# Patient Record
Sex: FEMALE | Race: BLACK OR AFRICAN AMERICAN | NOT HISPANIC OR LATINO
[De-identification: names, ages, dates, MRNs, and addresses within clinical notes are randomized per-mention and may not be internally consistent; named-entity substitution may affect disease eponyms.]

---

## 2017-04-10 PROBLEM — Z00.00 ENCOUNTER FOR PREVENTIVE HEALTH EXAMINATION: Status: ACTIVE | Noted: 2017-04-10

## 2017-04-21 ENCOUNTER — APPOINTMENT (OUTPATIENT)
Dept: ORTHOPEDIC SURGERY | Facility: CLINIC | Age: 77
End: 2017-04-21

## 2017-04-21 VITALS — HEART RATE: 120 BPM | SYSTOLIC BLOOD PRESSURE: 146 MMHG | DIASTOLIC BLOOD PRESSURE: 86 MMHG

## 2017-04-21 VITALS — BODY MASS INDEX: 26.43 KG/M2 | WEIGHT: 140 LBS | HEIGHT: 61 IN

## 2017-04-21 DIAGNOSIS — Z56.0 UNEMPLOYMENT, UNSPECIFIED: ICD-10-CM

## 2017-04-21 DIAGNOSIS — M25.562 PAIN IN RIGHT KNEE: ICD-10-CM

## 2017-04-21 DIAGNOSIS — Z78.9 OTHER SPECIFIED HEALTH STATUS: ICD-10-CM

## 2017-04-21 DIAGNOSIS — Z60.2 PROBLEMS RELATED TO LIVING ALONE: ICD-10-CM

## 2017-04-21 DIAGNOSIS — Z82.61 FAMILY HISTORY OF ARTHRITIS: ICD-10-CM

## 2017-04-21 DIAGNOSIS — Z86.73 PERSONAL HISTORY OF TRANSIENT ISCHEMIC ATTACK (TIA), AND CEREBRAL INFARCTION W/OUT RESIDUAL DEFICITS: ICD-10-CM

## 2017-04-21 DIAGNOSIS — Z87.39 PERSONAL HISTORY OF OTHER DISEASES OF THE MUSCULOSKELETAL SYSTEM AND CONNECTIVE TISSUE: ICD-10-CM

## 2017-04-21 DIAGNOSIS — O00.90 UNSPECIFIED. ECTOPIC. PREGNANCY WITHOUT INTRAUTERINE PREGNANCY: ICD-10-CM

## 2017-04-21 DIAGNOSIS — Z86.39 PERSONAL HISTORY OF OTHER ENDOCRINE, NUTRITIONAL AND METABOLIC DISEASE: ICD-10-CM

## 2017-04-21 DIAGNOSIS — Z86.79 PERSONAL HISTORY OF OTHER DISEASES OF THE CIRCULATORY SYSTEM: ICD-10-CM

## 2017-04-21 DIAGNOSIS — E78.00 PURE HYPERCHOLESTEROLEMIA, UNSPECIFIED: ICD-10-CM

## 2017-04-21 DIAGNOSIS — G89.29 PAIN IN RIGHT KNEE: ICD-10-CM

## 2017-04-21 DIAGNOSIS — M25.561 PAIN IN RIGHT KNEE: ICD-10-CM

## 2017-04-21 RX ORDER — NORTRIPTYLINE HYDROCHLORIDE 50 MG/1
50 CAPSULE ORAL
Refills: 0 | Status: ACTIVE | COMMUNITY

## 2017-04-21 RX ORDER — ENALAPRIL MALEATE 2.5 MG/1
2.5 TABLET ORAL
Refills: 0 | Status: ACTIVE | COMMUNITY

## 2017-04-21 RX ORDER — OMEPRAZOLE 20 MG/1
20 CAPSULE, DELAYED RELEASE ORAL
Refills: 0 | Status: ACTIVE | COMMUNITY

## 2017-04-21 RX ORDER — METFORMIN HYDROCHLORIDE 850 MG/1
850 TABLET, COATED ORAL
Refills: 0 | Status: ACTIVE | COMMUNITY

## 2017-04-21 RX ORDER — ROSUVASTATIN CALCIUM 10 MG/1
10 TABLET, FILM COATED ORAL
Refills: 0 | Status: ACTIVE | COMMUNITY

## 2017-04-21 RX ORDER — NICOTINE POLACRILEX 2 MG
GUM BUCCAL
Refills: 0 | Status: ACTIVE | COMMUNITY

## 2017-04-21 RX ORDER — ASPIRIN 81 MG
81 TABLET, DELAYED RELEASE (ENTERIC COATED) ORAL
Refills: 0 | Status: ACTIVE | COMMUNITY

## 2017-04-21 RX ORDER — SITAGLIPTIN 25 MG/1
25 TABLET, FILM COATED ORAL
Refills: 0 | Status: ACTIVE | COMMUNITY

## 2017-04-21 RX ORDER — METOPROLOL SUCCINATE 25 MG/1
25 TABLET, EXTENDED RELEASE ORAL
Refills: 0 | Status: ACTIVE | COMMUNITY

## 2017-04-21 RX ORDER — FOLIC ACID 1 MG/1
1 TABLET ORAL
Refills: 0 | Status: ACTIVE | COMMUNITY

## 2017-04-21 SDOH — ECONOMIC STABILITY - INCOME SECURITY: UNEMPLOYMENT, UNSPECIFIED: Z56.0

## 2017-04-21 SDOH — SOCIAL STABILITY - SOCIAL INSECURITY: PROBLEMS RELATED TO LIVING ALONE: Z60.2

## 2018-05-02 ENCOUNTER — OUTPATIENT (OUTPATIENT)
Dept: OUTPATIENT SERVICES | Facility: HOSPITAL | Age: 78
LOS: 1 days | End: 2018-05-02
Payer: MEDICARE

## 2018-05-02 VITALS
HEIGHT: 60 IN | WEIGHT: 138.89 LBS | RESPIRATION RATE: 16 BRPM | TEMPERATURE: 98 F | SYSTOLIC BLOOD PRESSURE: 145 MMHG | DIASTOLIC BLOOD PRESSURE: 89 MMHG | HEART RATE: 88 BPM | OXYGEN SATURATION: 98 %

## 2018-05-02 DIAGNOSIS — Z98.890 OTHER SPECIFIED POSTPROCEDURAL STATES: Chronic | ICD-10-CM

## 2018-05-02 DIAGNOSIS — Z90.79 ACQUIRED ABSENCE OF OTHER GENITAL ORGAN(S): Chronic | ICD-10-CM

## 2018-05-02 DIAGNOSIS — Z98.49 CATARACT EXTRACTION STATUS, UNSPECIFIED EYE: Chronic | ICD-10-CM

## 2018-05-02 DIAGNOSIS — M17.12 UNILATERAL PRIMARY OSTEOARTHRITIS, LEFT KNEE: ICD-10-CM

## 2018-05-02 DIAGNOSIS — Z90.89 ACQUIRED ABSENCE OF OTHER ORGANS: Chronic | ICD-10-CM

## 2018-05-02 DIAGNOSIS — Z01.818 ENCOUNTER FOR OTHER PREPROCEDURAL EXAMINATION: ICD-10-CM

## 2018-05-02 DIAGNOSIS — Z90.49 ACQUIRED ABSENCE OF OTHER SPECIFIED PARTS OF DIGESTIVE TRACT: Chronic | ICD-10-CM

## 2018-05-02 DIAGNOSIS — Z90.710 ACQUIRED ABSENCE OF BOTH CERVIX AND UTERUS: Chronic | ICD-10-CM

## 2018-05-02 LAB
MRSA PCR RESULT.: SIGNIFICANT CHANGE UP
S AUREUS DNA NOSE QL NAA+PROBE: SIGNIFICANT CHANGE UP

## 2018-05-02 PROCEDURE — 86900 BLOOD TYPING SEROLOGIC ABO: CPT

## 2018-05-02 PROCEDURE — 86850 RBC ANTIBODY SCREEN: CPT

## 2018-05-02 PROCEDURE — 87640 STAPH A DNA AMP PROBE: CPT

## 2018-05-02 PROCEDURE — G0463: CPT

## 2018-05-02 PROCEDURE — 86901 BLOOD TYPING SEROLOGIC RH(D): CPT

## 2018-05-02 PROCEDURE — 87641 MR-STAPH DNA AMP PROBE: CPT

## 2018-05-02 RX ORDER — TRAMADOL HYDROCHLORIDE 50 MG/1
50 TABLET ORAL ONCE
Qty: 0 | Refills: 0 | Status: DISCONTINUED | OUTPATIENT
Start: 2018-05-08 | End: 2018-05-08

## 2018-05-02 RX ORDER — SODIUM CHLORIDE 9 MG/ML
3 INJECTION INTRAMUSCULAR; INTRAVENOUS; SUBCUTANEOUS EVERY 8 HOURS
Qty: 0 | Refills: 0 | Status: DISCONTINUED | OUTPATIENT
Start: 2018-05-08 | End: 2018-05-08

## 2018-05-02 NOTE — H&P PST ADULT - PMH
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

## 2018-05-02 NOTE — H&P PST ADULT - NEGATIVE NEUROLOGICAL SYMPTOMS
no loss of consciousness/no hemiparesis/no weakness/no generalized seizures/no syncope/no confusion/no focal seizures/no tremors/no paresthesias/no vertigo/no loss of sensation

## 2018-05-02 NOTE — H&P PST ADULT - NEGATIVE CARDIOVASCULAR SYMPTOMS
no palpitations/no claudication/no paroxysmal nocturnal dyspnea/no chest pain/no dyspnea on exertion/no orthopnea/no peripheral edema

## 2018-05-02 NOTE — H&P PST ADULT - NEGATIVE ENMT SYMPTOMS
no hearing difficulty/no dysphagia/no nasal congestion/no ear pain/no vertigo/no sinus symptoms/no nasal discharge/no tinnitus/no post-nasal discharge/no throat pain/no nasal obstruction

## 2018-05-02 NOTE — H&P PST ADULT - GASTROINTESTINAL DETAILS
soft/no distention/normal/bowel sounds normal/no rebound tenderness/no bruit/no guarding/nontender/no masses palpable

## 2018-05-02 NOTE — H&P PST ADULT - NSANTHOSAYNRD_GEN_A_CORE
No. VIDA screening performed.  STOP BANG Legend: 0-2 = LOW Risk; 3-4 = INTERMEDIATE Risk; 5-8 = HIGH Risk

## 2018-05-02 NOTE — H&P PST ADULT - NEGATIVE GASTROINTESTINAL SYMPTOMS
no change in bowel habits/no flatulence/no nausea/no diarrhea/no constipation/no vomiting/no abdominal pain/no melena

## 2018-05-02 NOTE — H&P PST ADULT - NEGATIVE OPHTHALMOLOGIC SYMPTOMS
reading and distance glasses/no diplopia/no irritation R/no lacrimation L/no lacrimation R/no irritation L/no loss of vision L/no loss of vision R/no photophobia

## 2018-05-02 NOTE — H&P PST ADULT - PROBLEM SELECTOR PLAN 1
Patient is scheduled for left total knee replacement on 5/8/18.Patient is at moderate risk for this surgery.

## 2018-05-02 NOTE — H&P PST ADULT - MUSCULOSKELETAL
details… detailed exam no joint erythema/diminished strength/left and right knee, left knee tenderness to palpation/no joint swelling/no calf tenderness/no joint warmth/decreased ROM due to pain

## 2018-05-02 NOTE — H&P PST ADULT - NEGATIVE ALLERGY TYPES
no outdoor environmental allergies/no reactions to food/no reactions to insect bites/no indoor environmental allergies/no reactions to animals

## 2018-05-02 NOTE — H&P PST ADULT - FAMILY HISTORY
Diabetes mellitus, type 2     Mother  Still living? No  Diabetes mellitus, type 2, Age at diagnosis: Age Unknown  Cerebrovascular accident, Age at diagnosis: Age Unknown  Family history of cardiac disorder, Age at diagnosis: Age Unknown     Sibling  Still living? Yes, Estimated age: Age Unknown  Family history of breast cancer, Age at diagnosis: Age Unknown  Family history of asthma, Age at diagnosis: Age Unknown

## 2018-05-02 NOTE — H&P PST ADULT - RS GEN PE MLT RESP DETAILS PC
clear to auscultation bilaterally/no wheezes/respirations non-labored/breath sounds equal/good air movement/airway patent/no rales/no rhonchi

## 2018-05-02 NOTE — H&P PST ADULT - HISTORY OF PRESENT ILLNESS
78 years old female presented with left knee pain , limited ROM x long time, diagnosed with left knee primary OA and is scheduled for left total knee replacement on 5/8/18.

## 2018-05-02 NOTE — H&P PST ADULT - PSH
History of salpingo-oophorectomy  right - after ectopic pregnancy  S/P appendectomy    S/P arthroscopy of left knee  2005  S/P bunionectomy  right and left  S/P cataract extraction  bilateral eyes with artificial lenses  S/P eye surgery  bilateral  S/P hysterectomy with oophorectomy  1996  S/P tonsillectomy  age 17

## 2018-05-07 ENCOUNTER — TRANSCRIPTION ENCOUNTER (OUTPATIENT)
Age: 78
End: 2018-05-07

## 2018-05-08 ENCOUNTER — RESULT REVIEW (OUTPATIENT)
Age: 78
End: 2018-05-08

## 2018-05-08 ENCOUNTER — INPATIENT (INPATIENT)
Facility: HOSPITAL | Age: 78
LOS: 2 days | Discharge: NURSING, REHAB OR LONG-TERM CARE | DRG: 470 | End: 2018-05-11
Attending: ORTHOPAEDIC SURGERY | Admitting: ORTHOPAEDIC SURGERY
Payer: MEDICARE

## 2018-05-08 VITALS
HEIGHT: 60 IN | SYSTOLIC BLOOD PRESSURE: 130 MMHG | HEART RATE: 75 BPM | OXYGEN SATURATION: 100 % | RESPIRATION RATE: 18 BRPM | WEIGHT: 138.89 LBS | TEMPERATURE: 98 F | DIASTOLIC BLOOD PRESSURE: 65 MMHG

## 2018-05-08 DIAGNOSIS — Z98.49 CATARACT EXTRACTION STATUS, UNSPECIFIED EYE: Chronic | ICD-10-CM

## 2018-05-08 DIAGNOSIS — Z98.890 OTHER SPECIFIED POSTPROCEDURAL STATES: Chronic | ICD-10-CM

## 2018-05-08 DIAGNOSIS — Z90.710 ACQUIRED ABSENCE OF BOTH CERVIX AND UTERUS: Chronic | ICD-10-CM

## 2018-05-08 DIAGNOSIS — Z90.79 ACQUIRED ABSENCE OF OTHER GENITAL ORGAN(S): Chronic | ICD-10-CM

## 2018-05-08 DIAGNOSIS — M17.12 UNILATERAL PRIMARY OSTEOARTHRITIS, LEFT KNEE: ICD-10-CM

## 2018-05-08 DIAGNOSIS — Z90.49 ACQUIRED ABSENCE OF OTHER SPECIFIED PARTS OF DIGESTIVE TRACT: Chronic | ICD-10-CM

## 2018-05-08 DIAGNOSIS — Z90.89 ACQUIRED ABSENCE OF OTHER ORGANS: Chronic | ICD-10-CM

## 2018-05-08 LAB
ANION GAP SERPL CALC-SCNC: 5 MMOL/L — SIGNIFICANT CHANGE UP (ref 5–17)
BUN SERPL-MCNC: 8 MG/DL — SIGNIFICANT CHANGE UP (ref 7–18)
CALCIUM SERPL-MCNC: 8.5 MG/DL — SIGNIFICANT CHANGE UP (ref 8.4–10.5)
CHLORIDE SERPL-SCNC: 109 MMOL/L — HIGH (ref 96–108)
CO2 SERPL-SCNC: 30 MMOL/L — SIGNIFICANT CHANGE UP (ref 22–31)
CREAT SERPL-MCNC: 0.56 MG/DL — SIGNIFICANT CHANGE UP (ref 0.5–1.3)
GLUCOSE BLDC GLUCOMTR-MCNC: 152 MG/DL — HIGH (ref 70–99)
GLUCOSE BLDC GLUCOMTR-MCNC: 76 MG/DL — SIGNIFICANT CHANGE UP (ref 70–99)
GLUCOSE SERPL-MCNC: 96 MG/DL — SIGNIFICANT CHANGE UP (ref 70–99)
HCT VFR BLD CALC: 38.2 % — SIGNIFICANT CHANGE UP (ref 34.5–45)
HGB BLD-MCNC: 11.9 G/DL — SIGNIFICANT CHANGE UP (ref 11.5–15.5)
MCHC RBC-ENTMCNC: 28.3 PG — SIGNIFICANT CHANGE UP (ref 27–34)
MCHC RBC-ENTMCNC: 31.2 GM/DL — LOW (ref 32–36)
MCV RBC AUTO: 90.5 FL — SIGNIFICANT CHANGE UP (ref 80–100)
PLATELET # BLD AUTO: 234 K/UL — SIGNIFICANT CHANGE UP (ref 150–400)
POTASSIUM SERPL-MCNC: 4 MMOL/L — SIGNIFICANT CHANGE UP (ref 3.5–5.3)
POTASSIUM SERPL-SCNC: 4 MMOL/L — SIGNIFICANT CHANGE UP (ref 3.5–5.3)
RBC # BLD: 4.22 M/UL — SIGNIFICANT CHANGE UP (ref 3.8–5.2)
RBC # FLD: 13.7 % — SIGNIFICANT CHANGE UP (ref 10.3–14.5)
SODIUM SERPL-SCNC: 144 MMOL/L — SIGNIFICANT CHANGE UP (ref 135–145)
WBC # BLD: 6.4 K/UL — SIGNIFICANT CHANGE UP (ref 3.8–10.5)
WBC # FLD AUTO: 6.4 K/UL — SIGNIFICANT CHANGE UP (ref 3.8–10.5)

## 2018-05-08 PROCEDURE — 73562 X-RAY EXAM OF KNEE 3: CPT | Mod: 26,LT

## 2018-05-08 PROCEDURE — 88305 TISSUE EXAM BY PATHOLOGIST: CPT | Mod: 26

## 2018-05-08 PROCEDURE — 27447 TOTAL KNEE ARTHROPLASTY: CPT | Mod: AS,LT

## 2018-05-08 PROCEDURE — 73560 X-RAY EXAM OF KNEE 1 OR 2: CPT | Mod: 26

## 2018-05-08 PROCEDURE — 20900 REMOVAL OF BONE FOR GRAFT: CPT | Mod: AS,LT

## 2018-05-08 PROCEDURE — 88311 DECALCIFY TISSUE: CPT | Mod: 26

## 2018-05-08 PROCEDURE — 27339 EXC THIGH/KNEE TUM DEP 5CM/>: CPT | Mod: AS,59,LT

## 2018-05-08 RX ORDER — OXYCODONE HYDROCHLORIDE 5 MG/1
5 TABLET ORAL EVERY 4 HOURS
Qty: 0 | Refills: 0 | Status: DISCONTINUED | OUTPATIENT
Start: 2018-05-08 | End: 2018-05-11

## 2018-05-08 RX ORDER — SODIUM CHLORIDE 9 MG/ML
1000 INJECTION INTRAMUSCULAR; INTRAVENOUS; SUBCUTANEOUS
Qty: 0 | Refills: 0 | Status: DISCONTINUED | OUTPATIENT
Start: 2018-05-08 | End: 2018-05-11

## 2018-05-08 RX ORDER — ASCORBIC ACID 60 MG
500 TABLET,CHEWABLE ORAL
Qty: 0 | Refills: 0 | Status: DISCONTINUED | OUTPATIENT
Start: 2018-05-08 | End: 2018-05-11

## 2018-05-08 RX ORDER — KETOROLAC TROMETHAMINE 30 MG/ML
15 SYRINGE (ML) INJECTION EVERY 6 HOURS
Qty: 0 | Refills: 0 | Status: DISCONTINUED | OUTPATIENT
Start: 2018-05-08 | End: 2018-05-11

## 2018-05-08 RX ORDER — DEXAMETHASONE 0.5 MG/5ML
4 ELIXIR ORAL ONCE
Qty: 0 | Refills: 0 | Status: COMPLETED | OUTPATIENT
Start: 2018-05-08 | End: 2018-05-08

## 2018-05-08 RX ORDER — TRAMADOL HYDROCHLORIDE 50 MG/1
50 TABLET ORAL EVERY 8 HOURS
Qty: 0 | Refills: 0 | Status: DISCONTINUED | OUTPATIENT
Start: 2018-05-08 | End: 2018-05-11

## 2018-05-08 RX ORDER — ATORVASTATIN CALCIUM 80 MG/1
40 TABLET, FILM COATED ORAL AT BEDTIME
Qty: 0 | Refills: 0 | Status: DISCONTINUED | OUTPATIENT
Start: 2018-05-08 | End: 2018-05-11

## 2018-05-08 RX ORDER — NORTRIPTYLINE HYDROCHLORIDE 10 MG/1
50 CAPSULE ORAL DAILY
Qty: 0 | Refills: 0 | Status: DISCONTINUED | OUTPATIENT
Start: 2018-05-08 | End: 2018-05-11

## 2018-05-08 RX ORDER — CELECOXIB 200 MG/1
200 CAPSULE ORAL ONCE
Qty: 0 | Refills: 0 | Status: COMPLETED | OUTPATIENT
Start: 2018-05-08 | End: 2018-05-08

## 2018-05-08 RX ORDER — FERROUS SULFATE 325(65) MG
325 TABLET ORAL
Qty: 0 | Refills: 0 | Status: DISCONTINUED | OUTPATIENT
Start: 2018-05-08 | End: 2018-05-11

## 2018-05-08 RX ORDER — ASPIRIN/CALCIUM CARB/MAGNESIUM 324 MG
81 TABLET ORAL DAILY
Qty: 0 | Refills: 0 | Status: DISCONTINUED | OUTPATIENT
Start: 2018-05-09 | End: 2018-05-11

## 2018-05-08 RX ORDER — HYDROMORPHONE HYDROCHLORIDE 2 MG/ML
0.5 INJECTION INTRAMUSCULAR; INTRAVENOUS; SUBCUTANEOUS
Qty: 0 | Refills: 0 | Status: DISCONTINUED | OUTPATIENT
Start: 2018-05-08 | End: 2018-05-08

## 2018-05-08 RX ORDER — OMEPRAZOLE 10 MG/1
1 CAPSULE, DELAYED RELEASE ORAL
Qty: 0 | Refills: 0 | COMMUNITY

## 2018-05-08 RX ORDER — ACETAMINOPHEN 500 MG
1000 TABLET ORAL ONCE
Qty: 0 | Refills: 0 | Status: COMPLETED | OUTPATIENT
Start: 2018-05-08 | End: 2018-05-08

## 2018-05-08 RX ORDER — DOCUSATE SODIUM 100 MG
100 CAPSULE ORAL THREE TIMES A DAY
Qty: 0 | Refills: 0 | Status: DISCONTINUED | OUTPATIENT
Start: 2018-05-08 | End: 2018-05-11

## 2018-05-08 RX ORDER — ACETAMINOPHEN 500 MG
650 TABLET ORAL EVERY 6 HOURS
Qty: 0 | Refills: 0 | Status: DISCONTINUED | OUTPATIENT
Start: 2018-05-08 | End: 2018-05-11

## 2018-05-08 RX ORDER — ENOXAPARIN SODIUM 100 MG/ML
30 INJECTION SUBCUTANEOUS EVERY 12 HOURS
Qty: 0 | Refills: 0 | Status: DISCONTINUED | OUTPATIENT
Start: 2018-05-09 | End: 2018-05-11

## 2018-05-08 RX ORDER — METOPROLOL TARTRATE 50 MG
25 TABLET ORAL DAILY
Qty: 0 | Refills: 0 | Status: DISCONTINUED | OUTPATIENT
Start: 2018-05-08 | End: 2018-05-11

## 2018-05-08 RX ORDER — METFORMIN HYDROCHLORIDE 850 MG/1
1 TABLET ORAL
Qty: 0 | Refills: 0 | COMMUNITY

## 2018-05-08 RX ORDER — TRANEXAMIC ACID 100 MG/ML
1000 INJECTION, SOLUTION INTRAVENOUS ONCE
Qty: 0 | Refills: 0 | Status: DISCONTINUED | OUTPATIENT
Start: 2018-05-08 | End: 2018-05-08

## 2018-05-08 RX ORDER — SITAGLIPTIN 50 MG/1
1 TABLET, FILM COATED ORAL
Qty: 0 | Refills: 0 | COMMUNITY

## 2018-05-08 RX ORDER — ASPIRIN/CALCIUM CARB/MAGNESIUM 324 MG
1 TABLET ORAL
Qty: 0 | Refills: 0 | COMMUNITY

## 2018-05-08 RX ORDER — GABAPENTIN 400 MG/1
100 CAPSULE ORAL DAILY
Qty: 0 | Refills: 0 | Status: DISCONTINUED | OUTPATIENT
Start: 2018-05-08 | End: 2018-05-11

## 2018-05-08 RX ORDER — CHOLECALCIFEROL (VITAMIN D3) 125 MCG
1 CAPSULE ORAL
Qty: 0 | Refills: 0 | COMMUNITY

## 2018-05-08 RX ORDER — FOLIC ACID 0.8 MG
1 TABLET ORAL
Qty: 0 | Refills: 0 | COMMUNITY

## 2018-05-08 RX ORDER — ACETAMINOPHEN 500 MG
1000 TABLET ORAL ONCE
Qty: 0 | Refills: 0 | Status: DISCONTINUED | OUTPATIENT
Start: 2018-05-08 | End: 2018-05-11

## 2018-05-08 RX ORDER — CEFAZOLIN SODIUM 1 G
1000 VIAL (EA) INJECTION EVERY 8 HOURS
Qty: 0 | Refills: 0 | Status: COMPLETED | OUTPATIENT
Start: 2018-05-08 | End: 2018-05-09

## 2018-05-08 RX ORDER — HYDROMORPHONE HYDROCHLORIDE 2 MG/ML
0.5 INJECTION INTRAMUSCULAR; INTRAVENOUS; SUBCUTANEOUS EVERY 4 HOURS
Qty: 0 | Refills: 0 | Status: DISCONTINUED | OUTPATIENT
Start: 2018-05-08 | End: 2018-05-11

## 2018-05-08 RX ORDER — METOPROLOL TARTRATE 50 MG
1 TABLET ORAL
Qty: 0 | Refills: 0 | COMMUNITY

## 2018-05-08 RX ORDER — PANTOPRAZOLE SODIUM 20 MG/1
40 TABLET, DELAYED RELEASE ORAL
Qty: 0 | Refills: 0 | Status: DISCONTINUED | OUTPATIENT
Start: 2018-05-08 | End: 2018-05-11

## 2018-05-08 RX ORDER — GABAPENTIN 400 MG/1
100 CAPSULE ORAL ONCE
Qty: 0 | Refills: 0 | Status: COMPLETED | OUTPATIENT
Start: 2018-05-08 | End: 2018-05-08

## 2018-05-08 RX ORDER — BUPIVACAINE 13.3 MG/ML
20 INJECTION, SUSPENSION, LIPOSOMAL INFILTRATION ONCE
Qty: 0 | Refills: 0 | Status: DISCONTINUED | OUTPATIENT
Start: 2018-05-08 | End: 2018-05-08

## 2018-05-08 RX ORDER — ONDANSETRON 8 MG/1
4 TABLET, FILM COATED ORAL EVERY 6 HOURS
Qty: 0 | Refills: 0 | Status: DISCONTINUED | OUTPATIENT
Start: 2018-05-08 | End: 2018-05-11

## 2018-05-08 RX ORDER — FOLIC ACID 0.8 MG
1 TABLET ORAL DAILY
Qty: 0 | Refills: 0 | Status: DISCONTINUED | OUTPATIENT
Start: 2018-05-08 | End: 2018-05-11

## 2018-05-08 RX ADMIN — TRAMADOL HYDROCHLORIDE 50 MILLIGRAM(S): 50 TABLET ORAL at 10:59

## 2018-05-08 RX ADMIN — TRAMADOL HYDROCHLORIDE 50 MILLIGRAM(S): 50 TABLET ORAL at 00:15

## 2018-05-08 RX ADMIN — TRAMADOL HYDROCHLORIDE 50 MILLIGRAM(S): 50 TABLET ORAL at 22:34

## 2018-05-08 RX ADMIN — SODIUM CHLORIDE 103 MILLILITER(S): 9 INJECTION INTRAMUSCULAR; INTRAVENOUS; SUBCUTANEOUS at 22:34

## 2018-05-08 RX ADMIN — CELECOXIB 200 MILLIGRAM(S): 200 CAPSULE ORAL at 10:58

## 2018-05-08 RX ADMIN — GABAPENTIN 100 MILLIGRAM(S): 400 CAPSULE ORAL at 11:00

## 2018-05-08 RX ADMIN — Medication 4 MILLIGRAM(S): at 19:09

## 2018-05-08 RX ADMIN — ATORVASTATIN CALCIUM 40 MILLIGRAM(S): 80 TABLET, FILM COATED ORAL at 22:33

## 2018-05-08 RX ADMIN — Medication 100 MILLIGRAM(S): at 22:33

## 2018-05-08 NOTE — PHYSICAL THERAPY INITIAL EVALUATION ADULT - LIVES WITH, PROFILE
alone/Patient stated that she lives alone in a private house;has stairs to enter the house and inside the house

## 2018-05-08 NOTE — PHYSICAL THERAPY INITIAL EVALUATION ADULT - DIAGNOSIS, PT EVAL
Patient presented with pain, impairments in ROM/muscle strength, impairments in balance during ambulation

## 2018-05-08 NOTE — BRIEF OPERATIVE NOTE - PROCEDURE
<<-----Click on this checkbox to enter Procedure Total knee arthroplasty  05/08/2018    Active  KILEY

## 2018-05-09 DIAGNOSIS — E78.5 HYPERLIPIDEMIA, UNSPECIFIED: ICD-10-CM

## 2018-05-09 DIAGNOSIS — E11.9 TYPE 2 DIABETES MELLITUS WITHOUT COMPLICATIONS: ICD-10-CM

## 2018-05-09 DIAGNOSIS — I10 ESSENTIAL (PRIMARY) HYPERTENSION: ICD-10-CM

## 2018-05-09 LAB
ANION GAP SERPL CALC-SCNC: 5 MMOL/L — SIGNIFICANT CHANGE UP (ref 5–17)
BUN SERPL-MCNC: 10 MG/DL — SIGNIFICANT CHANGE UP (ref 7–18)
CALCIUM SERPL-MCNC: 8.9 MG/DL — SIGNIFICANT CHANGE UP (ref 8.4–10.5)
CHLORIDE SERPL-SCNC: 107 MMOL/L — SIGNIFICANT CHANGE UP (ref 96–108)
CO2 SERPL-SCNC: 27 MMOL/L — SIGNIFICANT CHANGE UP (ref 22–31)
CREAT SERPL-MCNC: 0.8 MG/DL — SIGNIFICANT CHANGE UP (ref 0.5–1.3)
GLUCOSE BLDC GLUCOMTR-MCNC: 106 MG/DL — HIGH (ref 70–99)
GLUCOSE BLDC GLUCOMTR-MCNC: 270 MG/DL — HIGH (ref 70–99)
GLUCOSE BLDC GLUCOMTR-MCNC: 324 MG/DL — HIGH (ref 70–99)
GLUCOSE SERPL-MCNC: 246 MG/DL — HIGH (ref 70–99)
HCT VFR BLD CALC: 29.4 % — LOW (ref 34.5–45)
HGB BLD-MCNC: 9.1 G/DL — LOW (ref 11.5–15.5)
MCHC RBC-ENTMCNC: 27.8 PG — SIGNIFICANT CHANGE UP (ref 27–34)
MCHC RBC-ENTMCNC: 30.9 GM/DL — LOW (ref 32–36)
MCV RBC AUTO: 90 FL — SIGNIFICANT CHANGE UP (ref 80–100)
PLATELET # BLD AUTO: 198 K/UL — SIGNIFICANT CHANGE UP (ref 150–400)
POTASSIUM SERPL-MCNC: 4.6 MMOL/L — SIGNIFICANT CHANGE UP (ref 3.5–5.3)
POTASSIUM SERPL-SCNC: 4.6 MMOL/L — SIGNIFICANT CHANGE UP (ref 3.5–5.3)
RBC # BLD: 3.26 M/UL — LOW (ref 3.8–5.2)
RBC # FLD: 13.8 % — SIGNIFICANT CHANGE UP (ref 10.3–14.5)
SODIUM SERPL-SCNC: 139 MMOL/L — SIGNIFICANT CHANGE UP (ref 135–145)
WBC # BLD: 12 K/UL — HIGH (ref 3.8–10.5)
WBC # FLD AUTO: 12 K/UL — HIGH (ref 3.8–10.5)

## 2018-05-09 PROCEDURE — 99223 1ST HOSP IP/OBS HIGH 75: CPT

## 2018-05-09 RX ORDER — INSULIN LISPRO 100/ML
VIAL (ML) SUBCUTANEOUS
Qty: 0 | Refills: 0 | Status: DISCONTINUED | OUTPATIENT
Start: 2018-05-09 | End: 2018-05-11

## 2018-05-09 RX ORDER — GLUCAGON INJECTION, SOLUTION 0.5 MG/.1ML
1 INJECTION, SOLUTION SUBCUTANEOUS ONCE
Qty: 0 | Refills: 0 | Status: DISCONTINUED | OUTPATIENT
Start: 2018-05-09 | End: 2018-05-11

## 2018-05-09 RX ORDER — INSULIN LISPRO 100/ML
6 VIAL (ML) SUBCUTANEOUS ONCE
Qty: 0 | Refills: 0 | Status: COMPLETED | OUTPATIENT
Start: 2018-05-09 | End: 2018-05-09

## 2018-05-09 RX ORDER — DEXTROSE 50 % IN WATER 50 %
25 SYRINGE (ML) INTRAVENOUS ONCE
Qty: 0 | Refills: 0 | Status: DISCONTINUED | OUTPATIENT
Start: 2018-05-09 | End: 2018-05-11

## 2018-05-09 RX ORDER — DEXTROSE 50 % IN WATER 50 %
12.5 SYRINGE (ML) INTRAVENOUS ONCE
Qty: 0 | Refills: 0 | Status: DISCONTINUED | OUTPATIENT
Start: 2018-05-09 | End: 2018-05-11

## 2018-05-09 RX ORDER — SODIUM CHLORIDE 9 MG/ML
1000 INJECTION, SOLUTION INTRAVENOUS
Qty: 0 | Refills: 0 | Status: DISCONTINUED | OUTPATIENT
Start: 2018-05-09 | End: 2018-05-11

## 2018-05-09 RX ORDER — DEXTROSE 50 % IN WATER 50 %
15 SYRINGE (ML) INTRAVENOUS ONCE
Qty: 0 | Refills: 0 | Status: DISCONTINUED | OUTPATIENT
Start: 2018-05-09 | End: 2018-05-11

## 2018-05-09 RX ADMIN — ENOXAPARIN SODIUM 30 MILLIGRAM(S): 100 INJECTION SUBCUTANEOUS at 08:13

## 2018-05-09 RX ADMIN — Medication 1 TABLET(S): at 12:02

## 2018-05-09 RX ADMIN — ENOXAPARIN SODIUM 30 MILLIGRAM(S): 100 INJECTION SUBCUTANEOUS at 14:20

## 2018-05-09 RX ADMIN — Medication 325 MILLIGRAM(S): at 17:31

## 2018-05-09 RX ADMIN — Medication 1 MILLIGRAM(S): at 12:06

## 2018-05-09 RX ADMIN — Medication 325 MILLIGRAM(S): at 08:31

## 2018-05-09 RX ADMIN — TRAMADOL HYDROCHLORIDE 50 MILLIGRAM(S): 50 TABLET ORAL at 23:06

## 2018-05-09 RX ADMIN — Medication 81 MILLIGRAM(S): at 12:01

## 2018-05-09 RX ADMIN — Medication 2.5 MILLIGRAM(S): at 07:08

## 2018-05-09 RX ADMIN — ATORVASTATIN CALCIUM 40 MILLIGRAM(S): 80 TABLET, FILM COATED ORAL at 22:05

## 2018-05-09 RX ADMIN — Medication 100 MILLIGRAM(S): at 07:13

## 2018-05-09 RX ADMIN — Medication 6: at 12:01

## 2018-05-09 RX ADMIN — Medication 100 MILLIGRAM(S): at 14:20

## 2018-05-09 RX ADMIN — Medication 500 MILLIGRAM(S): at 17:31

## 2018-05-09 RX ADMIN — Medication 500 MILLIGRAM(S): at 07:07

## 2018-05-09 RX ADMIN — Medication 100 MILLIGRAM(S): at 22:05

## 2018-05-09 RX ADMIN — Medication 100 MILLIGRAM(S): at 07:08

## 2018-05-09 RX ADMIN — Medication 325 MILLIGRAM(S): at 12:02

## 2018-05-09 RX ADMIN — PANTOPRAZOLE SODIUM 40 MILLIGRAM(S): 20 TABLET, DELAYED RELEASE ORAL at 07:07

## 2018-05-09 RX ADMIN — TRAMADOL HYDROCHLORIDE 50 MILLIGRAM(S): 50 TABLET ORAL at 22:06

## 2018-05-09 RX ADMIN — NORTRIPTYLINE HYDROCHLORIDE 50 MILLIGRAM(S): 10 CAPSULE ORAL at 12:03

## 2018-05-09 RX ADMIN — Medication 6 UNIT(S): at 23:12

## 2018-05-09 RX ADMIN — GABAPENTIN 100 MILLIGRAM(S): 400 CAPSULE ORAL at 12:03

## 2018-05-09 NOTE — PROGRESS NOTE ADULT - PROBLEM SELECTOR PLAN 1
78yFemale S/p L Total Knee Replacement POD# 1  - Pain control  - Daily PT-WBAT to LLE  - DVT ppx  - 78yFemale S/p L Total Knee Replacement POD# 1  - Pain control  - Daily PT-WBAT to LLE  - DVT ppx  - C/w 24 hr IV antibx postop

## 2018-05-09 NOTE — PROGRESS NOTE ADULT - SUBJECTIVE AND OBJECTIVE BOX
78yFemale    Diagnosis:  S/p L Total Knee Replacement POD# 1    Patient was seen and evaluated at bedside. Patient with no acute complaints.   Pain is well controlled.   Denies CP/SOB, dyspnea, paresthesias, N/V/D, palpitations.     Vital Signs Last 24 Hrs  T(C): 36.9 (09 May 2018 06:00), Max: 37 (08 May 2018 16:50)  T(F): 98.4 (09 May 2018 06:00), Max: 98.6 (08 May 2018 16:50)  HR: 83 (09 May 2018 06:00) (55 - 86)  BP: 117/57 (09 May 2018 06:00) (112/58 - 152/60)  BP(mean): 80 (08 May 2018 15:30) (78 - 98)  RR: 16 (09 May 2018 06:00) (12 - 20)  SpO2: 99% (09 May 2018 06:00) (98% - 100%)  I&O's Detail    08 May 2018 07:01  -  09 May 2018 07:00  --------------------------------------------------------  IN:    Lactated Ringers IV Bolus: 1800 mL    sodium chloride 0.9%.: 103 mL  Total IN: 1903 mL    OUT:    Estimated Blood Loss: 150 mL    Voided: 1200 mL  Total OUT: 1350 mL    Total NET: 553 mL    Physical Exam:    General: AAOx3, NAD, resting comfortably in bed.    L KNEE:  Dressing is C/D/I. Skin is pink and warm. Staples intact. No erythema. SILT.  Wound with no drainage, healing well.   Lower extremity:  No calf tenderness, calves are soft. 2+pulses. NVI. 5/5 Strength of EHL/TA/gastrocnemius B/L.  Good capillary refill.                           9.1    12.0  )-----------( 198      ( 09 May 2018 07:53 )             29.4     05-09    139  |  107  |  10  ----------------------------<  246<H>  4.6   |  27  |  0.80    Ca    8.9      09 May 2018 07:53        Impression:  78yFemale S/p ___ Total Knee Replacement POD#__  Plan:  -  Pain management  -  Dvt prophylaxis  -  Daily Physical Theapy:  WBAT  PWB   TTWB    NWB  of the ____lower extremity  -  Discharge planning: Home Vs. Rehab pending Physical therapy eval.  -  Heel bump to   -  Incentive Spirometer  -  Continue with Post-op Antibiotics x 24hrs  -  Discontinue Viramontes catheter today  -  Case d/w ___ 78yFemale    Diagnosis:  S/p L Total Knee Replacement POD# 1    Patient was seen and evaluated at bedside. Patient with no acute complaints.   Pain is well controlled. Pt tolerated PT well yesterday.   Denies CP/SOB, dyspnea, paresthesias, N/V/D, palpitations.     Vital Signs Last 24 Hrs  T(C): 36.9 (09 May 2018 06:00), Max: 37 (08 May 2018 16:50)  T(F): 98.4 (09 May 2018 06:00), Max: 98.6 (08 May 2018 16:50)  HR: 83 (09 May 2018 06:00) (55 - 86)  BP: 117/57 (09 May 2018 06:00) (112/58 - 152/60)  BP(mean): 80 (08 May 2018 15:30) (78 - 98)  RR: 16 (09 May 2018 06:00) (12 - 20)  SpO2: 99% (09 May 2018 06:00) (98% - 100%)  I&O's Detail    08 May 2018 07:01  -  09 May 2018 07:00  --------------------------------------------------------  IN:    Lactated Ringers IV Bolus: 1800 mL    sodium chloride 0.9%.: 103 mL  Total IN: 1903 mL    OUT:    Estimated Blood Loss: 150 mL    Voided: 1200 mL  Total OUT: 1350 mL    Total NET: 553 mL    Physical Exam:    General: AAOx3, NAD, resting comfortably in bed.    L KNEE:  Dressing is C/D/I. No swelling. No erythema. SILT.  Lower extremity:  No calf tenderness, calves are soft. 2+pulses. NVI. (+) EHL/FHL/ADF/APF.  Good capillary refill.                           9.1    12.0  )-----------( 198      ( 09 May 2018 07:53 )             29.4     05-09    139  |  107  |  10  ----------------------------<  246<H>  4.6   |  27  |  0.80    Ca    8.9      09 May 2018 07:53      Impression:  78yFemale S/p L Total Knee Replacement POD# 1  Plan:  -  Pain management  -  DVT prophylaxis  -  Daily Physical Therapy:  WBAT  to the LLE  -  Discharge planning: Home Vs. Rehab pending Physical therapy eval.  -  Heel bump to LLE  -  Incentive Spirometer  -  Continue with Post-op Antibiotics x 24hrs  -  Case d/w DR. Conteh

## 2018-05-10 ENCOUNTER — TRANSCRIPTION ENCOUNTER (OUTPATIENT)
Age: 78
End: 2018-05-10

## 2018-05-10 LAB
ANION GAP SERPL CALC-SCNC: 5 MMOL/L — SIGNIFICANT CHANGE UP (ref 5–17)
BUN SERPL-MCNC: 10 MG/DL — SIGNIFICANT CHANGE UP (ref 7–18)
CALCIUM SERPL-MCNC: 7.9 MG/DL — LOW (ref 8.4–10.5)
CHLORIDE SERPL-SCNC: 105 MMOL/L — SIGNIFICANT CHANGE UP (ref 96–108)
CO2 SERPL-SCNC: 29 MMOL/L — SIGNIFICANT CHANGE UP (ref 22–31)
CREAT SERPL-MCNC: 0.66 MG/DL — SIGNIFICANT CHANGE UP (ref 0.5–1.3)
GLUCOSE BLDC GLUCOMTR-MCNC: 167 MG/DL — HIGH (ref 70–99)
GLUCOSE BLDC GLUCOMTR-MCNC: 179 MG/DL — HIGH (ref 70–99)
GLUCOSE BLDC GLUCOMTR-MCNC: 190 MG/DL — HIGH (ref 70–99)
GLUCOSE BLDC GLUCOMTR-MCNC: 211 MG/DL — HIGH (ref 70–99)
GLUCOSE SERPL-MCNC: 185 MG/DL — HIGH (ref 70–99)
HCT VFR BLD CALC: 24.6 % — LOW (ref 34.5–45)
HGB BLD-MCNC: 7.7 G/DL — LOW (ref 11.5–15.5)
MCHC RBC-ENTMCNC: 28.2 PG — SIGNIFICANT CHANGE UP (ref 27–34)
MCHC RBC-ENTMCNC: 31.4 GM/DL — LOW (ref 32–36)
MCV RBC AUTO: 89.7 FL — SIGNIFICANT CHANGE UP (ref 80–100)
PLATELET # BLD AUTO: 158 K/UL — SIGNIFICANT CHANGE UP (ref 150–400)
POTASSIUM SERPL-MCNC: 3.3 MMOL/L — LOW (ref 3.5–5.3)
POTASSIUM SERPL-SCNC: 3.3 MMOL/L — LOW (ref 3.5–5.3)
RBC # BLD: 2.74 M/UL — LOW (ref 3.8–5.2)
RBC # FLD: 14.1 % — SIGNIFICANT CHANGE UP (ref 10.3–14.5)
SODIUM SERPL-SCNC: 139 MMOL/L — SIGNIFICANT CHANGE UP (ref 135–145)
SURGICAL PATHOLOGY FINAL REPORT - CH: SIGNIFICANT CHANGE UP
WBC # BLD: 10.8 K/UL — HIGH (ref 3.8–10.5)
WBC # FLD AUTO: 10.8 K/UL — HIGH (ref 3.8–10.5)

## 2018-05-10 RX ORDER — DOCUSATE SODIUM 100 MG
100 CAPSULE ORAL
Qty: 0 | Refills: 0 | COMMUNITY

## 2018-05-10 RX ORDER — FUROSEMIDE 40 MG
20 TABLET ORAL ONCE
Qty: 0 | Refills: 0 | Status: COMPLETED | OUTPATIENT
Start: 2018-05-10 | End: 2018-05-10

## 2018-05-10 RX ORDER — MELOXICAM 15 MG/1
1 TABLET ORAL
Qty: 0 | Refills: 0 | COMMUNITY

## 2018-05-10 RX ORDER — ACETAMINOPHEN 500 MG
1000 TABLET ORAL ONCE
Qty: 0 | Refills: 0 | Status: COMPLETED | OUTPATIENT
Start: 2018-05-10 | End: 2018-05-10

## 2018-05-10 RX ORDER — HYDROCORTISONE 1 %
1 OINTMENT (GRAM) TOPICAL
Qty: 0 | Refills: 0 | COMMUNITY

## 2018-05-10 RX ORDER — GABAPENTIN 400 MG/1
1 CAPSULE ORAL
Qty: 0 | Refills: 0 | DISCHARGE
Start: 2018-05-10

## 2018-05-10 RX ORDER — DIPHENHYDRAMINE HCL 50 MG
25 CAPSULE ORAL ONCE
Qty: 0 | Refills: 0 | Status: COMPLETED | OUTPATIENT
Start: 2018-05-10 | End: 2018-05-10

## 2018-05-10 RX ORDER — POTASSIUM CHLORIDE 20 MEQ
40 PACKET (EA) ORAL EVERY 4 HOURS
Qty: 0 | Refills: 0 | Status: COMPLETED | OUTPATIENT
Start: 2018-05-10 | End: 2018-05-10

## 2018-05-10 RX ADMIN — Medication 325 MILLIGRAM(S): at 12:03

## 2018-05-10 RX ADMIN — Medication 1 TABLET(S): at 12:03

## 2018-05-10 RX ADMIN — ENOXAPARIN SODIUM 30 MILLIGRAM(S): 100 INJECTION SUBCUTANEOUS at 14:16

## 2018-05-10 RX ADMIN — PANTOPRAZOLE SODIUM 40 MILLIGRAM(S): 20 TABLET, DELAYED RELEASE ORAL at 06:11

## 2018-05-10 RX ADMIN — GABAPENTIN 100 MILLIGRAM(S): 400 CAPSULE ORAL at 12:03

## 2018-05-10 RX ADMIN — TRAMADOL HYDROCHLORIDE 50 MILLIGRAM(S): 50 TABLET ORAL at 16:45

## 2018-05-10 RX ADMIN — ATORVASTATIN CALCIUM 40 MILLIGRAM(S): 80 TABLET, FILM COATED ORAL at 22:47

## 2018-05-10 RX ADMIN — Medication 40 MILLIEQUIVALENT(S): at 11:39

## 2018-05-10 RX ADMIN — Medication 81 MILLIGRAM(S): at 12:05

## 2018-05-10 RX ADMIN — Medication 2.5 MILLIGRAM(S): at 05:34

## 2018-05-10 RX ADMIN — Medication 25 MILLIGRAM(S): at 18:30

## 2018-05-10 RX ADMIN — Medication 1000 MILLIGRAM(S): at 18:30

## 2018-05-10 RX ADMIN — Medication 40 MILLIEQUIVALENT(S): at 14:16

## 2018-05-10 RX ADMIN — Medication 4: at 08:26

## 2018-05-10 RX ADMIN — Medication 20 MILLIGRAM(S): at 14:58

## 2018-05-10 RX ADMIN — Medication 15 MILLIGRAM(S): at 11:05

## 2018-05-10 RX ADMIN — Medication 100 MILLIGRAM(S): at 22:47

## 2018-05-10 RX ADMIN — ENOXAPARIN SODIUM 30 MILLIGRAM(S): 100 INJECTION SUBCUTANEOUS at 01:06

## 2018-05-10 RX ADMIN — Medication 15 MILLIGRAM(S): at 10:39

## 2018-05-10 RX ADMIN — TRAMADOL HYDROCHLORIDE 50 MILLIGRAM(S): 50 TABLET ORAL at 14:16

## 2018-05-10 RX ADMIN — Medication 325 MILLIGRAM(S): at 08:27

## 2018-05-10 RX ADMIN — TRAMADOL HYDROCHLORIDE 50 MILLIGRAM(S): 50 TABLET ORAL at 05:34

## 2018-05-10 RX ADMIN — Medication 25 MILLIGRAM(S): at 05:34

## 2018-05-10 RX ADMIN — TRAMADOL HYDROCHLORIDE 50 MILLIGRAM(S): 50 TABLET ORAL at 06:30

## 2018-05-10 RX ADMIN — Medication 500 MILLIGRAM(S): at 05:34

## 2018-05-10 RX ADMIN — Medication 2: at 11:37

## 2018-05-10 RX ADMIN — NORTRIPTYLINE HYDROCHLORIDE 50 MILLIGRAM(S): 10 CAPSULE ORAL at 12:03

## 2018-05-10 RX ADMIN — Medication 2: at 17:00

## 2018-05-10 RX ADMIN — Medication 325 MILLIGRAM(S): at 18:30

## 2018-05-10 RX ADMIN — Medication 400 MILLIGRAM(S): at 18:30

## 2018-05-10 RX ADMIN — Medication 500 MILLIGRAM(S): at 18:30

## 2018-05-10 RX ADMIN — Medication 100 MILLIGRAM(S): at 14:16

## 2018-05-10 RX ADMIN — Medication 1 MILLIGRAM(S): at 12:07

## 2018-05-10 NOTE — DISCHARGE NOTE ADULT - CARE PROVIDER_API CALL
Angelo Conteh (MD), Orthopaedic Surgery; Sports Medicine  [address]  [place] [address]  Phone: [phone]  Fax: [phone]

## 2018-05-10 NOTE — DISCHARGE NOTE ADULT - PATIENT PORTAL LINK FT
You can access the [facility] Patient Portal, offered by [facility], by registering with the following website: [email or web address]

## 2018-05-10 NOTE — DISCHARGE NOTE ADULT - CARE PLAN
Principal Discharge DX:	Primary osteoarthritis of left knee  Goal:	IMPROVE ROM  Assessment and plan of treatment:	IMPROVE PAIN  Secondary Diagnosis:	HLD (hyperlipidemia)  Secondary Diagnosis:	Hypertension  Secondary Diagnosis:	Lower back pain

## 2018-05-10 NOTE — PROGRESS NOTE ADULT - SUBJECTIVE AND OBJECTIVE BOX
78yFemale    Diagnosis:  S/p Left Total Knee Replacement POD#2    Patient was seen and evaluated at bedside. Patient with no acute complaints.   Pain is  well controlled.  seen by PT, ambulating well.    Denies dizziness, CP/SOB, dyspnea, paresthesias, N/V/D, palpitations.     Vital Signs Last 24 Hrs  T(C): 37.3 (10 May 2018 06:39), Max: 37.5 (09 May 2018 22:05)  T(F): 99.1 (10 May 2018 06:39), Max: 99.5 (09 May 2018 22:05)  HR: 105 (10 May 2018 06:39) (87 - 105)  BP: 128/49 (10 May 2018 06:39) (109/45 - 128/49)  BP(mean): --  RR: 16 (10 May 2018 06:39) (16 - 18)  SpO2: 99% (10 May 2018 06:39) (97% - 99%)  I&O's Detail      Physical Exam:    General: AAOx3, NAD, resting comfortably in bed.    Left knee:  Dressing removed-> Wound is clean, dry, with staples intact. No erythema. Skin is pink and warm. SILT.  No drainage.   Lower extremities:  No calf tenderness, calves are soft. 2+pulses. NVI. 5/5 Strength of EHL/TA/gastrocnemius B/L.  Good capillary refill. SILT.                          7.7    10.8  )-----------( 158      ( 10 May 2018 07:53 )             24.6     05-10    139  |  105  |  10  ----------------------------<  185<H>  3.3<L>   |  29  |  0.66    Ca    7.9<L>      10 May 2018 07:53        Impression:  78yFemale S/p Left Total Knee Replacement POD#3  Post-op Acute blood loss anemia - continue iron supplementation, monitor vital, patient is asymptomatic  Hypokalemia - Replace with PO Potassium  Plan:  -  Pain management  -  Dvt prophylaxis with Lovenox  -  Daily Physical Therapy:  WBAT of the left lower extremity with walker  -  Discharge planning: Home today  -  Case d/w Dr. Conteh  -  Dressing changed today, new dressing applied.  -  Incentive spirometry encouraged. 78yFemale    Diagnosis:  S/p Left Total Knee Replacement POD#2    Patient was seen and evaluated at bedside. Patient with no acute complaints.   Pain is  well controlled.  seen by PT, ambulating well.    Denies dizziness, CP/SOB, dyspnea, paresthesias, N/V/D, palpitations.     Vital Signs Last 24 Hrs  T(C): 37.3 (10 May 2018 06:39), Max: 37.5 (09 May 2018 22:05)  T(F): 99.1 (10 May 2018 06:39), Max: 99.5 (09 May 2018 22:05)  HR: 105 (10 May 2018 06:39) (87 - 105)  BP: 128/49 (10 May 2018 06:39) (109/45 - 128/49)  BP(mean): --  RR: 16 (10 May 2018 06:39) (16 - 18)  SpO2: 99% (10 May 2018 06:39) (97% - 99%)  I&O's Detail      Physical Exam:    General: AAOx3, NAD, resting comfortably in bed.    Left knee:  Dressing removed-> Wound is clean, dry, with staples intact. No erythema. Skin is pink and warm. SILT.  No drainage.   Lower extremities:  No calf tenderness, calves are soft. 2+pulses. NVI. 5/5 Strength of EHL/TA/gastrocnemius B/L.  Good capillary refill. SILT.                          7.7    10.8  )-----------( 158      ( 10 May 2018 07:53 )             24.6     05-10    139  |  105  |  10  ----------------------------<  185<H>  3.3<L>   |  29  |  0.66    Ca    7.9<L>      10 May 2018 07:53        Impression:  78yFemale S/p Left Total Knee Replacement POD#3  Post-op Acute blood loss anemia - continue iron supplementation, monitor vital, patient is asymptomatic  Hypokalemia - Replace with PO Potassium  Plan:  -  Pain management  -  Dvt prophylaxis with Lovenox  -  Daily Physical Therapy:  WBAT of the left lower extremity with walker  -  Discharge planning: Home today  -  Case d/w Dr. Conteh  -  Acute blood loss anemia, post-operative: Transfuse 2 units prbc now.  - lasix 20mg IV prior to 2nd unit prbc  -  Dressing changed today, new dressing applied.  -  Incentive spirometry encouraged. 78yFemale    Diagnosis:  S/p Left Total Knee Replacement POD#2    Patient was seen and evaluated at bedside. Patient with no acute complaints.   Pain is  well controlled.  seen by PT, ambulating well.    Denies dizziness, CP/SOB, dyspnea, paresthesias, N/V/D, palpitations.     Vital Signs Last 24 Hrs  T(C): 37.3 (10 May 2018 06:39), Max: 37.5 (09 May 2018 22:05)  T(F): 99.1 (10 May 2018 06:39), Max: 99.5 (09 May 2018 22:05)  HR: 105 (10 May 2018 06:39) (87 - 105)  BP: 128/49 (10 May 2018 06:39) (109/45 - 128/49)  BP(mean): --  RR: 16 (10 May 2018 06:39) (16 - 18)  SpO2: 99% (10 May 2018 06:39) (97% - 99%)  I&O's Detail      Physical Exam:    General: AAOx3, NAD, resting comfortably in bed.    Left knee:  Dressing removed-> Wound is clean, dry, with staples intact. No erythema. Skin is pink and warm. SILT.  No drainage.   Lower extremities:  No calf tenderness, calves are soft. 2+pulses. NVI. 5/5 Strength of EHL/TA/gastrocnemius B/L.  Good capillary refill. SILT.                          7.7    10.8  )-----------( 158      ( 10 May 2018 07:53 )             24.6     05-10    139  |  105  |  10  ----------------------------<  185<H>  3.3<L>   |  29  |  0.66    Ca    7.9<L>      10 May 2018 07:53        Impression:  78yFemale S/p Left Total Knee Replacement POD#3  Post-op Acute blood loss anemia - continue iron supplementation, monitor vital, patient is asymptomatic  Hypokalemia - Replace with PO Potassium  Plan:  -  Pain management  -  Dvt prophylaxis with Lovenox  -  Daily Physical Therapy:  WBAT of the left lower extremity with walker  -  Discharge planning: Rehab Friday  -  Case d/w Dr. Conteh  -  Acute blood loss anemia, post-operative: Transfuse 2 units prbc now.  - lasix 20mg IV prior to 2nd unit prbc  -  Dressing changed today, new dressing applied.  -  Incentive spirometry encouraged.

## 2018-05-10 NOTE — DISCHARGE NOTE ADULT - ADDITIONAL INSTRUCTIONS
Pain Management- See Attached Medication Reconciliation    **StretchStrap Stretching exercises for Total knee replacement***  Weight Bearing Status: _wbat with walker  Equipment needs: Commode, Walker  Dressing: Please keep bandage/dressing Clean, Dry, and Intact.  Incision Site: REMOVE STAPLES on _5/23/2018  STAPLES AND DRESSING TO BE REMOVED BY NURSE  NURSE TO APPLY STERI-STRIPS TO THE WOUND AFTER STAPLE REMOVAL   Dvt prophylaxis: D/C LOVENOX on _5/23/2018  PT/Occupational Therapy are Activities of Daily Living as appropriate  Follow up with Orthopedic Surgeon after STAPLES ARE REMOVED at: [phone] DR PETERSEN

## 2018-05-10 NOTE — DISCHARGE NOTE ADULT - MEDICATION SUMMARY - MEDICATIONS TO TAKE
I will START or STAY ON the medications listed below when I get home from the hospital:    calcium  -- 1 tab(s) by mouth once a day  -- Indication: For AS PER MD    Percocet 5/325 oral tablet  -- 1-2  tab(s) by mouth every 6 hours, As Needed FOR PAIN  -- Indication: For PAIN    aspirin 81 mg oral tablet  -- 1 tab(s) by mouth once a day  -- Indication: For AS PER MD    enalapril 2.5 mg oral tablet  -- 1 tab(s) by mouth once a day  -- Indication: For HTN    Lovenox  -- 30 milligram(s) subcutaneous every 12 hours  -- Indication: For DVT PPX    gabapentin 100 mg oral capsule  -- 1 cap(s) by mouth once a day  -- Indication: For PAIN    nortriptyline 50 mg oral capsule  -- 1 cap(s) by mouth once a day, As Needed  -- Indication: For AS PER MD    Januvia 25 mg oral tablet  -- 1 tab(s) by mouth once a day  -- Indication: For AS PER MD    metFORMIN 500 mg oral tablet  -- 1 tab(s) by mouth 2 times a day  -- Indication: For AS PER MD    rosuvastatin 10 mg oral tablet  -- 1 tab(s) by mouth once a day (at bedtime)  -- Indication: For AS PER MD    Metoprolol Succinate ER 25 mg oral tablet, extended release  -- 1 tab(s) by mouth once a day  -- Indication: For HTN    ferrous sulfate 325 mg (65 mg elemental iron) oral tablet  -- 1 tab(s) by mouth 2 times a day  -- Indication: For AnEMIA    Colace  -- 100 milligram(s) by mouth 3 times a day  -- Indication: For CONSTIPATION    Artificial Tears ophthalmic solution  -- 1 drop(s) to each affected eye 2 times a day  -- Indication: For AS PER MD    omeprazole 20 mg oral delayed release capsule  -- 1 cap(s) by mouth once a day  -- Indication: For AS PER MD    Vitamin D3 2000 intl units oral capsule  -- 1 cap(s) by mouth once a day  -- Indication: For AS PER MD    folic acid 1 mg oral tablet  -- 1 tab(s) by mouth once a day  -- Indication: For AS PER MD

## 2018-05-11 VITALS
DIASTOLIC BLOOD PRESSURE: 59 MMHG | SYSTOLIC BLOOD PRESSURE: 114 MMHG | HEART RATE: 100 BPM | TEMPERATURE: 98 F | RESPIRATION RATE: 18 BRPM | OXYGEN SATURATION: 98 %

## 2018-05-11 DIAGNOSIS — G89.18 OTHER ACUTE POSTPROCEDURAL PAIN: ICD-10-CM

## 2018-05-11 DIAGNOSIS — Z96.652 PRESENCE OF LEFT ARTIFICIAL KNEE JOINT: ICD-10-CM

## 2018-05-11 LAB
ANION GAP SERPL CALC-SCNC: 3 MMOL/L — LOW (ref 5–17)
BUN SERPL-MCNC: 12 MG/DL — SIGNIFICANT CHANGE UP (ref 7–18)
CALCIUM SERPL-MCNC: 8.4 MG/DL — SIGNIFICANT CHANGE UP (ref 8.4–10.5)
CHLORIDE SERPL-SCNC: 108 MMOL/L — SIGNIFICANT CHANGE UP (ref 96–108)
CO2 SERPL-SCNC: 29 MMOL/L — SIGNIFICANT CHANGE UP (ref 22–31)
CREAT SERPL-MCNC: 0.66 MG/DL — SIGNIFICANT CHANGE UP (ref 0.5–1.3)
GLUCOSE BLDC GLUCOMTR-MCNC: 111 MG/DL — HIGH (ref 70–99)
GLUCOSE BLDC GLUCOMTR-MCNC: 146 MG/DL — HIGH (ref 70–99)
GLUCOSE BLDC GLUCOMTR-MCNC: 204 MG/DL — HIGH (ref 70–99)
GLUCOSE SERPL-MCNC: 135 MG/DL — HIGH (ref 70–99)
HCT VFR BLD CALC: 34.5 % — SIGNIFICANT CHANGE UP (ref 34.5–45)
HGB BLD-MCNC: 10.6 G/DL — LOW (ref 11.5–15.5)
MCHC RBC-ENTMCNC: 27.3 PG — SIGNIFICANT CHANGE UP (ref 27–34)
MCHC RBC-ENTMCNC: 30.7 GM/DL — LOW (ref 32–36)
MCV RBC AUTO: 88.9 FL — SIGNIFICANT CHANGE UP (ref 80–100)
PLATELET # BLD AUTO: 182 K/UL — SIGNIFICANT CHANGE UP (ref 150–400)
POTASSIUM SERPL-MCNC: 4.8 MMOL/L — SIGNIFICANT CHANGE UP (ref 3.5–5.3)
POTASSIUM SERPL-SCNC: 4.8 MMOL/L — SIGNIFICANT CHANGE UP (ref 3.5–5.3)
RBC # BLD: 3.88 M/UL — SIGNIFICANT CHANGE UP (ref 3.8–5.2)
RBC # FLD: 15 % — HIGH (ref 10.3–14.5)
SODIUM SERPL-SCNC: 140 MMOL/L — SIGNIFICANT CHANGE UP (ref 135–145)
WBC # BLD: 10.6 K/UL — HIGH (ref 3.8–10.5)
WBC # FLD AUTO: 10.6 K/UL — HIGH (ref 3.8–10.5)

## 2018-05-11 PROCEDURE — 80048 BASIC METABOLIC PNL TOTAL CA: CPT

## 2018-05-11 PROCEDURE — C1713: CPT

## 2018-05-11 PROCEDURE — 97110 THERAPEUTIC EXERCISES: CPT

## 2018-05-11 PROCEDURE — C1776: CPT

## 2018-05-11 PROCEDURE — 82962 GLUCOSE BLOOD TEST: CPT

## 2018-05-11 PROCEDURE — 97116 GAIT TRAINING THERAPY: CPT

## 2018-05-11 PROCEDURE — 36430 TRANSFUSION BLD/BLD COMPNT: CPT

## 2018-05-11 PROCEDURE — 88305 TISSUE EXAM BY PATHOLOGIST: CPT

## 2018-05-11 PROCEDURE — 86850 RBC ANTIBODY SCREEN: CPT

## 2018-05-11 PROCEDURE — 86900 BLOOD TYPING SEROLOGIC ABO: CPT

## 2018-05-11 PROCEDURE — 97530 THERAPEUTIC ACTIVITIES: CPT

## 2018-05-11 PROCEDURE — 73562 X-RAY EXAM OF KNEE 3: CPT

## 2018-05-11 PROCEDURE — 99233 SBSQ HOSP IP/OBS HIGH 50: CPT

## 2018-05-11 PROCEDURE — 85027 COMPLETE CBC AUTOMATED: CPT

## 2018-05-11 PROCEDURE — 88311 DECALCIFY TISSUE: CPT

## 2018-05-11 PROCEDURE — 86923 COMPATIBILITY TEST ELECTRIC: CPT

## 2018-05-11 PROCEDURE — 86901 BLOOD TYPING SEROLOGIC RH(D): CPT

## 2018-05-11 PROCEDURE — P9040: CPT

## 2018-05-11 RX ADMIN — NORTRIPTYLINE HYDROCHLORIDE 50 MILLIGRAM(S): 10 CAPSULE ORAL at 13:22

## 2018-05-11 RX ADMIN — ENOXAPARIN SODIUM 30 MILLIGRAM(S): 100 INJECTION SUBCUTANEOUS at 07:08

## 2018-05-11 RX ADMIN — Medication 4: at 16:49

## 2018-05-11 RX ADMIN — Medication 1 TABLET(S): at 13:21

## 2018-05-11 RX ADMIN — Medication 500 MILLIGRAM(S): at 19:10

## 2018-05-11 RX ADMIN — Medication 1 MILLIGRAM(S): at 14:05

## 2018-05-11 RX ADMIN — Medication 500 MILLIGRAM(S): at 07:08

## 2018-05-11 RX ADMIN — Medication 100 MILLIGRAM(S): at 13:23

## 2018-05-11 RX ADMIN — TRAMADOL HYDROCHLORIDE 50 MILLIGRAM(S): 50 TABLET ORAL at 14:00

## 2018-05-11 RX ADMIN — OXYCODONE HYDROCHLORIDE 5 MILLIGRAM(S): 5 TABLET ORAL at 11:08

## 2018-05-11 RX ADMIN — Medication 325 MILLIGRAM(S): at 19:10

## 2018-05-11 RX ADMIN — ENOXAPARIN SODIUM 30 MILLIGRAM(S): 100 INJECTION SUBCUTANEOUS at 13:22

## 2018-05-11 RX ADMIN — Medication 81 MILLIGRAM(S): at 13:21

## 2018-05-11 RX ADMIN — GABAPENTIN 100 MILLIGRAM(S): 400 CAPSULE ORAL at 14:05

## 2018-05-11 RX ADMIN — PANTOPRAZOLE SODIUM 40 MILLIGRAM(S): 20 TABLET, DELAYED RELEASE ORAL at 07:07

## 2018-05-11 RX ADMIN — Medication 325 MILLIGRAM(S): at 13:23

## 2018-05-11 RX ADMIN — Medication 325 MILLIGRAM(S): at 08:17

## 2018-05-11 RX ADMIN — TRAMADOL HYDROCHLORIDE 50 MILLIGRAM(S): 50 TABLET ORAL at 13:22

## 2018-05-11 RX ADMIN — Medication 100 MILLIGRAM(S): at 07:08

## 2018-05-11 NOTE — PROGRESS NOTE ADULT - PROBLEM SELECTOR PLAN 1
- tramadol 50mg po q 8  - oxy po prn  - toradol iv prn  - stool softeners  - oob  - gabapentin 100mg po daily

## 2018-05-11 NOTE — PROGRESS NOTE ADULT - SUBJECTIVE AND OBJECTIVE BOX
Ortho Note POD# 3  78yFemale    Diagnosis:  S/p Left Total Knee Replacement POD# 3    Patient is seen and evaluated at bedside; offers no acute complaints. Pain is mild; well controlled.  Has been OOB with PT; ambulation with walker    Vital Signs Last 24 Hrs  T(C): 36.5 (11 May 2018 04:05), Max: 37.9 (10 May 2018 17:29)  T(F): 97.7 (11 May 2018 04:05), Max: 100.2 (10 May 2018 17:29)  HR: 84 (11 May 2018 04:05) (79 - 102)  BP: 95/41 (11 May 2018 04:05) (81/43 - 113/53)  BP(mean): --  RR: 16 (11 May 2018 04:05) (16 - 16)  SpO2: 98% (11 May 2018 04:05) (97% - 100%)    Physical Exam:    General: AAOx3, in NAD, resting in bed.    Left knee:  In nl. alignment. Wound C/D/I; healing well.  Staples intact. Calves are soft, non-tender. 2+pulses. NVI.                          10.6   10.6  )-----------( 182      ( 11 May 2018 07:01 )             34.5     05-11    140  |  108  |  12  ----------------------------<  135<H>  4.8   |  29  |  0.66    Ca    8.4      11 May 2018 07:01        Impression:  78yFemale S/p Left total knee replacement POD# 3  Plan:  -  Continue pain management  -  DVT prophylaxis with Lovenox  -  Daily Physical Therapy:  WBAT on LLE with walker  -  Discharge planning today rehab  -  Dressing changed  -  Encouraged use of incentive spirometer  -  D/c Lovenox and d/c staples in 11 days  -  Case d/w

## 2018-05-11 NOTE — PROGRESS NOTE ADULT - SUBJECTIVE AND OBJECTIVE BOX
NP Note discussed with  Primary Attending    Patient is a 78y old  Female who presents with a chief complaint of " I need surgery for my left knee" (10 May 2018 13:06).  78 year old female, s/p left knee replacement, pod#3.  Pt complaining of mild left knee pain which worsens with exertion.  No nausea or vomiting.  No chest pain or sob.        INTERVAL HPI/OVERNIGHT EVENTS: no new complaints    MEDICATIONS  (STANDING):  acetaminophen  IVPB. 1000 milliGRAM(s) IV Intermittent once  ascorbic acid 500 milliGRAM(s) Oral two times a day  aspirin  chewable 81 milliGRAM(s) Oral daily  atorvastatin 40 milliGRAM(s) Oral at bedtime  dextrose 5%. 1000 milliLiter(s) (50 mL/Hr) IV Continuous <Continuous>  dextrose 50% Injectable 12.5 Gram(s) IV Push once  dextrose 50% Injectable 25 Gram(s) IV Push once  dextrose 50% Injectable 25 Gram(s) IV Push once  docusate sodium 100 milliGRAM(s) Oral three times a day  enalapril 2.5 milliGRAM(s) Oral daily  enoxaparin Injectable 30 milliGRAM(s) SubCutaneous every 12 hours  ferrous    sulfate 325 milliGRAM(s) Oral three times a day with meals  folic acid 1 milliGRAM(s) Oral daily  gabapentin 100 milliGRAM(s) Oral daily  insulin lispro (HumaLOG) corrective regimen sliding scale   SubCutaneous three times a day before meals  metoprolol succinate ER 25 milliGRAM(s) Oral daily  multivitamin 1 Tablet(s) Oral daily  nortriptyline 50 milliGRAM(s) Oral daily  pantoprazole    Tablet 40 milliGRAM(s) Oral before breakfast  sodium chloride 0.9%. 1000 milliLiter(s) (103 mL/Hr) IV Continuous <Continuous>  traMADol 50 milliGRAM(s) Oral every 8 hours    MEDICATIONS  (PRN):  acetaminophen   Tablet 650 milliGRAM(s) Oral every 6 hours PRN For Temp over 38.3 C (100.94 F)  aluminum hydroxide/magnesium hydroxide/simethicone Suspension 30 milliLiter(s) Oral four times a day PRN Indigestion  artificial  tears Solution 1 Drop(s) Both EYES every 6 hours PRN Dry Eyes  dextrose 40% Gel 15 Gram(s) Oral once PRN Blood Glucose LESS THAN 70 milliGRAM(s)/deciliter  glucagon  Injectable 1 milliGRAM(s) IntraMuscular once PRN Glucose LESS THAN 70 milligrams/deciliter  HYDROmorphone  Injectable 0.5 milliGRAM(s) IV Push every 4 hours PRN Severe Pain  ketorolac   Injectable 15 milliGRAM(s) IV Push every 6 hours PRN BREAKTHROUGH PAIN  ondansetron Injectable 4 milliGRAM(s) IV Push every 6 hours PRN Nausea and/or Vomiting  oxyCODONE    IR 5 milliGRAM(s) Oral every 4 hours PRN Mild Pain      __________________________________________________  REVIEW OF SYSTEMS:    CONSTITUTIONAL: No fever,   EYES: no acute visual disturbances  NECK: No pain or stiffness  RESPIRATORY: No cough; No shortness of breath  CARDIOVASCULAR: No chest pain, no palpitations  GASTROINTESTINAL: No pain. No nausea or vomiting; No diarrhea   NEUROLOGICAL: No headache or numbness, no tremors  MUSCULOSKELETAL: + left knee pain  GENITOURINARY: no dysuria, no frequency, no hesitancy  PSYCHIATRY: no depression , no anxiety  ALL OTHER  ROS negative        Vital Signs Last 24 Hrs  T(C): 36.5 (11 May 2018 04:05), Max: 37.9 (10 May 2018 17:29)  T(F): 97.7 (11 May 2018 04:05), Max: 100.2 (10 May 2018 17:29)  HR: 84 (11 May 2018 04:05) (79 - 102)  BP: 95/41 (11 May 2018 04:05) (81/43 - 113/45)  BP(mean): --  RR: 16 (11 May 2018 04:05) (16 - 16)  SpO2: 98% (11 May 2018 04:05) (97% - 100%)    ________________________________________________  PHYSICAL EXAM:  GENERAL: NAD  HEENT: Normocephalic;  conjunctivae and sclerae clear; moist mucous membranes;   NECK : supple  CHEST/LUNG: Clear to auscultation bilaterally with good air entry   HEART: S1 S2  regular; no murmurs, gallops or rubs  ABDOMEN: Soft, Nontender, Nondistended; Bowel sounds present  EXTREMITIES: no cyanosis; no edema; no calf tenderness  SKIN: warm and dry; no rash  NERVOUS SYSTEM:  Awake and alert; Oriented  to place, person and time ; no new deficits  MUSCULOSKELETAL: + decreased rom - left knee, + left knee tenderness    _________________________________________________  LABS:                        10.6   10.6  )-----------( 182      ( 11 May 2018 07:01 )             34.5     05-11    140  |  108  |  12  ----------------------------<  135<H>  4.8   |  29  |  0.66    Ca    8.4      11 May 2018 07:01          CAPILLARY BLOOD GLUCOSE      POCT Blood Glucose.: 111 mg/dL (11 May 2018 07:55)  POCT Blood Glucose.: 167 mg/dL (10 May 2018 21:47)  POCT Blood Glucose.: 179 mg/dL (10 May 2018 16:47)  POCT Blood Glucose.: 190 mg/dL (10 May 2018 11:07)        RADIOLOGY & ADDITIONAL TESTS:    Imaging Personally Reviewed:  YES/NO    Consultant(s) Notes Reviewed:   YES/ No    Care Discussed with Consultants :     Plan of care was discussed with patient and /or primary care giver; all questions and concerns were addressed and care was aligned with patient's wishes.

## 2018-07-27 PROBLEM — Z86.73 HISTORY OF STROKE: Status: RESOLVED | Noted: 2017-04-21 | Resolved: 2018-07-27

## 2019-10-02 PROBLEM — Z60.2 PERSON LIVING ALONE: Status: ACTIVE | Noted: 2017-04-21

## 2021-02-28 NOTE — ASU PREOP CHECKLIST - PATIENT PROBLEMS/NEEDS
Power Melendez RN  Outcome: Ongoing Patient expressed no known problems or needs

## 2022-11-16 NOTE — BRIEF OPERATIVE NOTE - IV INFUSIONS - CRYSTALLOIDS
Chief complaint:   Chief Complaint   Patient presents with   • Cough       Vitals:  Visit Vitals  /80 (BP Location: LUE - Left upper extremity, Patient Position: Sitting, Cuff Size: Large Adult)   Pulse 72   Temp 96.7 °F (35.9 °C) (Tympanic)   Resp 18   Ht 5' 10\" (1.778 m)   Wt 104.2 kg (229 lb 12.8 oz)   SpO2 97%   BMI 32.97 kg/m²       HISTORY OF PRESENT ILLNESS     70-year-old gentleman presents to the urgent care clinic with concerns for having bronchitis.  Patient has received 4 doses a COVID-19 vaccine, most recently October 2nd, 2022.  Patient has received the flu vaccine this year.  Patient reports prior history of pneumonia.  He also had history of yearly bronchitis.  His last bronchitis diagnosis was about 3 years ago.  Today he comes in with 4 day history of productive cough. Reports nasal congestion,  Sore throat last night.  Patient has allergies to grass.  He has taken Claritin which has not helped.  No fever.  No chills.  Patient is extremely concerned that this will turn into bronchitis. Z-delia in the past have been ineffective unless he takes 2 of the z-delia.  Denies any sick contacts.  Patient is retired.  Hobbies include woodworking.    Past medical history, past surgical history, allergies, medications, and social history reviewed in the Epic chart as noted today.      Other significant problems:  Patient Active Problem List    Diagnosis Date Noted   • Hyperglycemia 11/29/2017     Priority: Low   • Sensorineural hearing loss, unilateral 02/20/2009     Priority: Low   • Esophageal reflux 08/18/2008     Priority: Low     flip's     • Ulcerative (chronic) proctitis (CMS/[facility]) 03/03/2008     Priority: Low     bx focal active colitis     • Central serous retinopathy 05/23/2007     Priority: Low   • Acute atopic conjunctivitis 01/27/2003     Priority: Low   • Presbyopia 01/27/2003     Priority: Low   • Benign neoplasm of choroid 01/27/2003     Priority: Low   • Other and unspecified hyperlipidemia  10/21/2002     Priority: Low   • Calculus of kidney 10/21/2002     Priority: Low   • SENSORNEUR HEAR LOSS -left ? etiol 10/21/2002     Priority: Low       PAST MEDICAL, FAMILY AND SOCIAL HISTORY     Medications:  Current Outpatient Medications   Medication Sig Dispense Refill   • atorvastatin (LIPITOR) 10 MG tablet Take 1 tablet by mouth daily. 90 tablet 3   • metoPROLOL succinate (TOPROL-XL) 25 MG 24 hr tablet Take 1 tablet by mouth daily. 90 tablet 3   • Mesalamine 800 MG Tablet Enteric Coated Take one tablet in the morning and 2 in the evening. 270 tablet 1   • esomeprazole (NEXIUM) 20 MG capsule Take 40 mg by mouth every evening.     • Vitamins-Lipotropics (LIPOGEN SG PO) Take 1 tablet by mouth daily.     • cholecalciferol (VITAMIN D3) 1000 UNITS tablet Take 2,000 Units by mouth daily.     • Cyanocobalamin (B-12 PO) Take 1 tablet by mouth daily.      • Loratadine (CLARITIN) 10 MG OR TABS ONE TABLET BY MOUTH DAILY (Patient taking differently: as needed.) 0 0   • MULTIVITAMINS PO CAPS Take 1 capsule by mouth daily. 0 0     No current facility-administered medications for this visit.       Allergies:  ALLERGIES:   Allergen Reactions   • Atorvastatin HIVES   • Metoprolol HIVES   • Grass Other (See Comments)     SINUS CONGESTION       Past Medical  History/Surgeries:  Past Medical History:   Diagnosis Date   • Allergic rhinitis, cause unspecified    • Allergy    • Car's esophagus 8/18/08   • Calculus of kidney 12/92    L side, removed by basket extraction, stent placed, Dr. Bowling   • Diaphragmatic hernia without mention of obstruction or gangrene 3/3/08; 10/11/10    2 cm   • Diaphragmatic hernia without mention of obstruction or gangrene 8/18/08; 7/11/13    4 cm   • Duodenitis without mention of hemorrhage 7/11/13    H Pylori negative   • Dysplastic nevus    • Esophageal reflux 4/97    hiatal hernia per UGI   • Esophageal reflux 3/3/08   • Esophageal reflux 8/18/08; 10/11/10; 7/11/13    patulous EG junction    • Esophagitis, unspecified 10/11/10    Barretts identified on bx   • Failed moderate sedation during procedure    • Other and unspecified hyperlipidemia    • Other specified gastritis without mention of hemorrhage 3/3/08    H Pylori negative   • Other specified gastritis without mention of hemorrhage 10/11/10    HPylori negative   • Reflux esophagitis 3/3/08   • Ulcerative (chronic) proctitis (CMS/HCC) 3/3/08; 10/11/10    bx focal active colitis   • Ulcerative (chronic) proctitis (CMS/HCC) 10/11/10    bx quercent colitis   • Ulcerative (chronic) proctitis (CMS/HCC) 7/11/13    mild patchy eryuthema rectally Bx normal   • Ulcerative colitis (CMS/HCC)    • Unspecified hearing loss 10/98    80 percent hearing loss L ear   • Unspecified hemorrhoids without mention of complication 3/3/08    internal       Past Surgical History:   Procedure Laterality Date   • Colonoscopy      9/29/2017, 1/14/2020, 1/14/2022   • Colonoscopy w biopsy      Dr. Haney - 3/3/2008, 10/11/2010, 7/11/2013   • Cystoscopy  07/31/2020   • Cystoscopy,insert ureteral stent      for kidney stones   • Egd  01/14/2022    Repeat in 6 months with biopsy   • Esophagogastroduodenoscopy  09/29/2017    Repeat in 2 years.  Due:  9/29/2019.  Barretts esophagus is stable with no dysplasia.   • Esophagogastroduodenoscopy  01/14/2020    Repeat 3 years  MAC ANESTHESIA  BARRETTS   • Esophagogastroduodenoscopy transoral flex diag N/A 10/01/2021    with ablation. Repeat endoscopy with possible ablation in 3 months   • Esophagogastroduodenoscopy transoral flex w/bx single or mult      Dr. Haney-berrios's - 3/3/2008, 8/18/2008, 10/11/2010, 7/11/2013   • Esophagogastroduodenoscopy transoral flex w/bx single or mult  07/15/2022    berrios's-- no cells found repeat 3 yrs   • Hand finger surgery unlisted Right 1998    crushing injury to index finger   • Inguinal hernia repair Left 1972       Family History:  Family History   Problem Relation Age of Onset   • Hypertension  Mother    • Thyroid Mother         thyroidectomy   • Cancer Mother         Breast cancer   • Gastrointestinal Father         colon polyps, diverticulosis   • Stroke Father         april 2002   • Cancer Father         melanoma       Social History:  Social History     Tobacco Use   • Smoking status: Never Smoker   • Smokeless tobacco: Never Used   Substance Use Topics   • Alcohol use: No       REVIEW OF SYSTEMS     Review of Systems   Constitutional: Negative for activity change, appetite change and fever.   HENT: Positive for congestion and sore throat. Negative for ear pain and postnasal drip.    Eyes: Negative for discharge.   Respiratory: Positive for cough and chest tightness. Negative for shortness of breath and wheezing.    Cardiovascular: Negative for chest pain, palpitations and leg swelling.   Gastrointestinal: Negative for abdominal pain.   Genitourinary: Negative for difficulty urinating.   Musculoskeletal: Negative for back pain, gait problem and neck pain.   Skin: Negative for rash.   Neurological: Negative for dizziness, facial asymmetry, light-headedness and headaches.       PHYSICAL EXAM     Physical Exam  Vitals and nursing note reviewed.   Constitutional:       General: He is not in acute distress.     Appearance: Normal appearance. He is well-developed. He is not diaphoretic.   HENT:      Head: Normocephalic and atraumatic.      Right Ear: External ear normal.      Left Ear: External ear normal.      Nose: Congestion present.      Mouth/Throat:      Pharynx: No oropharyngeal exudate.      Comments: Posterior pharynx with drainage.  Uvula midline.     Neck: Normal range of motion.   Eyes:      General: No scleral icterus.        Right eye: No discharge.         Left eye: No discharge.      Conjunctiva/sclera: Conjunctivae normal.      Pupils: Pupils are equal, round, and reactive to light.   Neck:      Thyroid: No thyromegaly.      Trachea: No tracheal deviation.   Cardiovascular:      Rate and  Rhythm: Normal rate and regular rhythm.      Heart sounds: Normal heart sounds.   Pulmonary:      Effort: Pulmonary effort is normal. No respiratory distress.      Breath sounds: Rhonchi present. No wheezing or rales.      Comments: No wheezing.  Pulse ox on room air 97%.  Rare rhonchi.  No usage of accessory muscles for breathing.  Chest:      Chest wall: No tenderness.   Lymphadenopathy:      Cervical: No cervical adenopathy.   Neurological:      General: No focal deficit present.      Mental Status: He is alert and oriented to person, place, and time.      Motor: No abnormal muscle tone.   Psychiatric:         Mood and Affect: Mood normal.         Behavior: Behavior normal.         Thought Content: Thought content normal.         Judgment: Judgment normal.         ASSESSMENT/PLAN     Darci was seen today for cough.    Diagnoses and all orders for this visit:    Bronchitis  -     cefdinir (OMNICEF) 300 MG capsule; Take 1 capsule by mouth in the morning and 1 capsule in the evening. Do all this for 7 days.    Patient was provided a pocket prescription for antibiotic, he will start the antibiotic if  symptoms persist or worsen after 10-14 days of symptoms.  Side effects of medications reviewed. Patient was advised to follow-up with primary care provider in the next 1 to 2 days. Patient will return to the urgent care clinic or go to the ER if signs and symptoms are worsening or persist. Home care instructions reviewed with the patient, see AVS.    Note:  Patient was wearing a mask.  Writer was wearing gown, gloves, goggles, mask, and face shield.     1800

## 2023-10-25 ENCOUNTER — INPATIENT (INPATIENT)
Facility: HOSPITAL | Age: 83
LOS: 4 days | Discharge: NURSING, REHAB OR LONG-TERM CARE | End: 2023-10-30
Attending: INTERNAL MEDICINE | Admitting: INTERNAL MEDICINE
Payer: MEDICARE

## 2023-10-25 VITALS
OXYGEN SATURATION: 95 % | WEIGHT: 115.08 LBS | HEART RATE: 133 BPM | DIASTOLIC BLOOD PRESSURE: 83 MMHG | SYSTOLIC BLOOD PRESSURE: 120 MMHG | RESPIRATION RATE: 18 BRPM | TEMPERATURE: 99 F | HEIGHT: 61 IN

## 2023-10-25 DIAGNOSIS — E46 UNSPECIFIED PROTEIN-CALORIE MALNUTRITION: ICD-10-CM

## 2023-10-25 DIAGNOSIS — I10 ESSENTIAL (PRIMARY) HYPERTENSION: ICD-10-CM

## 2023-10-25 DIAGNOSIS — E11.8 TYPE 2 DIABETES MELLITUS WITH UNSPECIFIED COMPLICATIONS: ICD-10-CM

## 2023-10-25 DIAGNOSIS — Z90.49 ACQUIRED ABSENCE OF OTHER SPECIFIED PARTS OF DIGESTIVE TRACT: Chronic | ICD-10-CM

## 2023-10-25 DIAGNOSIS — Z90.89 ACQUIRED ABSENCE OF OTHER ORGANS: Chronic | ICD-10-CM

## 2023-10-25 DIAGNOSIS — Z98.890 OTHER SPECIFIED POSTPROCEDURAL STATES: Chronic | ICD-10-CM

## 2023-10-25 DIAGNOSIS — Z98.49 CATARACT EXTRACTION STATUS, UNSPECIFIED EYE: Chronic | ICD-10-CM

## 2023-10-25 DIAGNOSIS — E43 UNSPECIFIED SEVERE PROTEIN-CALORIE MALNUTRITION: ICD-10-CM

## 2023-10-25 DIAGNOSIS — E78.5 HYPERLIPIDEMIA, UNSPECIFIED: ICD-10-CM

## 2023-10-25 DIAGNOSIS — R62.7 ADULT FAILURE TO THRIVE: ICD-10-CM

## 2023-10-25 DIAGNOSIS — Z90.79 ACQUIRED ABSENCE OF OTHER GENITAL ORGAN(S): Chronic | ICD-10-CM

## 2023-10-25 DIAGNOSIS — Z90.710 ACQUIRED ABSENCE OF BOTH CERVIX AND UTERUS: Chronic | ICD-10-CM

## 2023-10-25 DIAGNOSIS — R09.89 OTHER SPECIFIED SYMPTOMS AND SIGNS INVOLVING THE CIRCULATORY AND RESPIRATORY SYSTEMS: ICD-10-CM

## 2023-10-25 PROBLEM — M79.7 FIBROMYALGIA: Chronic | Status: ACTIVE | Noted: 2018-05-02

## 2023-10-25 PROBLEM — K21.9 GASTRO-ESOPHAGEAL REFLUX DISEASE WITHOUT ESOPHAGITIS: Chronic | Status: ACTIVE | Noted: 2018-05-02

## 2023-10-25 PROBLEM — D25.9 LEIOMYOMA OF UTERUS, UNSPECIFIED: Chronic | Status: ACTIVE | Noted: 2018-05-02

## 2023-10-25 PROBLEM — M06.9 RHEUMATOID ARTHRITIS, UNSPECIFIED: Chronic | Status: ACTIVE | Noted: 2018-05-02

## 2023-10-25 PROBLEM — G45.9 TRANSIENT CEREBRAL ISCHEMIC ATTACK, UNSPECIFIED: Chronic | Status: ACTIVE | Noted: 2018-05-02

## 2023-10-25 PROBLEM — M17.12 UNILATERAL PRIMARY OSTEOARTHRITIS, LEFT KNEE: Chronic | Status: ACTIVE | Noted: 2018-05-02

## 2023-10-25 PROBLEM — G43.909 MIGRAINE, UNSPECIFIED, NOT INTRACTABLE, WITHOUT STATUS MIGRAINOSUS: Chronic | Status: ACTIVE | Noted: 2018-05-02

## 2023-10-25 PROBLEM — M21.611 BUNION OF RIGHT FOOT: Chronic | Status: ACTIVE | Noted: 2018-05-02

## 2023-10-25 PROBLEM — H04.123 DRY EYE SYNDROME OF BILATERAL LACRIMAL GLANDS: Chronic | Status: ACTIVE | Noted: 2018-05-02

## 2023-10-25 PROBLEM — M21.612 BUNION OF LEFT FOOT: Chronic | Status: ACTIVE | Noted: 2018-05-02

## 2023-10-25 PROBLEM — E11.9 TYPE 2 DIABETES MELLITUS WITHOUT COMPLICATIONS: Chronic | Status: ACTIVE | Noted: 2018-05-02

## 2023-10-25 PROBLEM — S82.899A OTHER FRACTURE OF UNSPECIFIED LOWER LEG, INITIAL ENCOUNTER FOR CLOSED FRACTURE: Chronic | Status: ACTIVE | Noted: 2018-05-02

## 2023-10-25 PROBLEM — H26.9 UNSPECIFIED CATARACT: Chronic | Status: ACTIVE | Noted: 2018-05-02

## 2023-10-25 PROBLEM — O00.90 UNSPECIFIED ECTOPIC PREGNANCY WITHOUT INTRAUTERINE PREGNANCY: Chronic | Status: ACTIVE | Noted: 2018-05-02

## 2023-10-25 PROBLEM — M54.5 LOW BACK PAIN: Chronic | Status: ACTIVE | Noted: 2018-05-02

## 2023-10-25 PROBLEM — K37 UNSPECIFIED APPENDICITIS: Chronic | Status: ACTIVE | Noted: 2018-05-02

## 2023-10-25 PROBLEM — S03.00XA DISLOCATION OF JAW, UNSPECIFIED SIDE, INITIAL ENCOUNTER: Chronic | Status: ACTIVE | Noted: 2018-05-02

## 2023-10-25 PROBLEM — H35.00 UNSPECIFIED BACKGROUND RETINOPATHY: Chronic | Status: ACTIVE | Noted: 2018-05-02

## 2023-10-25 LAB
ACETONE SERPL-MCNC: ABNORMAL
ACETONE SERPL-MCNC: ABNORMAL
ALBUMIN SERPL ELPH-MCNC: 2.3 G/DL — LOW (ref 3.3–5)
ALBUMIN SERPL ELPH-MCNC: 2.3 G/DL — LOW (ref 3.3–5)
ALP SERPL-CCNC: 102 U/L — SIGNIFICANT CHANGE UP (ref 40–120)
ALP SERPL-CCNC: 102 U/L — SIGNIFICANT CHANGE UP (ref 40–120)
ALT FLD-CCNC: 10 U/L — LOW (ref 12–78)
ALT FLD-CCNC: 10 U/L — LOW (ref 12–78)
ANION GAP SERPL CALC-SCNC: 9 MMOL/L — SIGNIFICANT CHANGE UP (ref 5–17)
ANION GAP SERPL CALC-SCNC: 9 MMOL/L — SIGNIFICANT CHANGE UP (ref 5–17)
APTT BLD: 36.1 SEC — HIGH (ref 24.5–35.6)
APTT BLD: 36.1 SEC — HIGH (ref 24.5–35.6)
AST SERPL-CCNC: 12 U/L — LOW (ref 15–37)
AST SERPL-CCNC: 12 U/L — LOW (ref 15–37)
BASOPHILS # BLD AUTO: 0.05 K/UL — SIGNIFICANT CHANGE UP (ref 0–0.2)
BASOPHILS # BLD AUTO: 0.05 K/UL — SIGNIFICANT CHANGE UP (ref 0–0.2)
BASOPHILS NFR BLD AUTO: 0.6 % — SIGNIFICANT CHANGE UP (ref 0–2)
BASOPHILS NFR BLD AUTO: 0.6 % — SIGNIFICANT CHANGE UP (ref 0–2)
BILIRUB SERPL-MCNC: 0.6 MG/DL — SIGNIFICANT CHANGE UP (ref 0.2–1.2)
BILIRUB SERPL-MCNC: 0.6 MG/DL — SIGNIFICANT CHANGE UP (ref 0.2–1.2)
BUN SERPL-MCNC: 25 MG/DL — HIGH (ref 7–23)
BUN SERPL-MCNC: 25 MG/DL — HIGH (ref 7–23)
CALCIUM SERPL-MCNC: 9.9 MG/DL — SIGNIFICANT CHANGE UP (ref 8.5–10.1)
CALCIUM SERPL-MCNC: 9.9 MG/DL — SIGNIFICANT CHANGE UP (ref 8.5–10.1)
CHLORIDE SERPL-SCNC: 98 MMOL/L — SIGNIFICANT CHANGE UP (ref 96–108)
CHLORIDE SERPL-SCNC: 98 MMOL/L — SIGNIFICANT CHANGE UP (ref 96–108)
CO2 SERPL-SCNC: 30 MMOL/L — SIGNIFICANT CHANGE UP (ref 22–31)
CO2 SERPL-SCNC: 30 MMOL/L — SIGNIFICANT CHANGE UP (ref 22–31)
CREAT SERPL-MCNC: 0.75 MG/DL — SIGNIFICANT CHANGE UP (ref 0.5–1.3)
CREAT SERPL-MCNC: 0.75 MG/DL — SIGNIFICANT CHANGE UP (ref 0.5–1.3)
EGFR: 80 ML/MIN/1.73M2 — SIGNIFICANT CHANGE UP
EGFR: 80 ML/MIN/1.73M2 — SIGNIFICANT CHANGE UP
EOSINOPHIL # BLD AUTO: 0.05 K/UL — SIGNIFICANT CHANGE UP (ref 0–0.5)
EOSINOPHIL # BLD AUTO: 0.05 K/UL — SIGNIFICANT CHANGE UP (ref 0–0.5)
EOSINOPHIL NFR BLD AUTO: 0.6 % — SIGNIFICANT CHANGE UP (ref 0–6)
EOSINOPHIL NFR BLD AUTO: 0.6 % — SIGNIFICANT CHANGE UP (ref 0–6)
FLUAV AG NPH QL: SIGNIFICANT CHANGE UP
FLUAV AG NPH QL: SIGNIFICANT CHANGE UP
FLUBV AG NPH QL: SIGNIFICANT CHANGE UP
FLUBV AG NPH QL: SIGNIFICANT CHANGE UP
GLUCOSE BLDC GLUCOMTR-MCNC: 267 MG/DL — HIGH (ref 70–99)
GLUCOSE BLDC GLUCOMTR-MCNC: 267 MG/DL — HIGH (ref 70–99)
GLUCOSE BLDC GLUCOMTR-MCNC: 313 MG/DL — HIGH (ref 70–99)
GLUCOSE BLDC GLUCOMTR-MCNC: 313 MG/DL — HIGH (ref 70–99)
GLUCOSE SERPL-MCNC: 289 MG/DL — HIGH (ref 70–99)
GLUCOSE SERPL-MCNC: 289 MG/DL — HIGH (ref 70–99)
HCT VFR BLD CALC: 47 % — HIGH (ref 34.5–45)
HCT VFR BLD CALC: 47 % — HIGH (ref 34.5–45)
HGB BLD-MCNC: 15.4 G/DL — SIGNIFICANT CHANGE UP (ref 11.5–15.5)
HGB BLD-MCNC: 15.4 G/DL — SIGNIFICANT CHANGE UP (ref 11.5–15.5)
IMM GRANULOCYTES NFR BLD AUTO: 0.5 % — SIGNIFICANT CHANGE UP (ref 0–0.9)
IMM GRANULOCYTES NFR BLD AUTO: 0.5 % — SIGNIFICANT CHANGE UP (ref 0–0.9)
INR BLD: 1.03 RATIO — SIGNIFICANT CHANGE UP (ref 0.85–1.18)
INR BLD: 1.03 RATIO — SIGNIFICANT CHANGE UP (ref 0.85–1.18)
LYMPHOCYTES # BLD AUTO: 1.17 K/UL — SIGNIFICANT CHANGE UP (ref 1–3.3)
LYMPHOCYTES # BLD AUTO: 1.17 K/UL — SIGNIFICANT CHANGE UP (ref 1–3.3)
LYMPHOCYTES # BLD AUTO: 14 % — SIGNIFICANT CHANGE UP (ref 13–44)
LYMPHOCYTES # BLD AUTO: 14 % — SIGNIFICANT CHANGE UP (ref 13–44)
MCHC RBC-ENTMCNC: 29.3 PG — SIGNIFICANT CHANGE UP (ref 27–34)
MCHC RBC-ENTMCNC: 29.3 PG — SIGNIFICANT CHANGE UP (ref 27–34)
MCHC RBC-ENTMCNC: 32.8 G/DL — SIGNIFICANT CHANGE UP (ref 32–36)
MCHC RBC-ENTMCNC: 32.8 G/DL — SIGNIFICANT CHANGE UP (ref 32–36)
MCV RBC AUTO: 89.5 FL — SIGNIFICANT CHANGE UP (ref 80–100)
MCV RBC AUTO: 89.5 FL — SIGNIFICANT CHANGE UP (ref 80–100)
MONOCYTES # BLD AUTO: 0.66 K/UL — SIGNIFICANT CHANGE UP (ref 0–0.9)
MONOCYTES # BLD AUTO: 0.66 K/UL — SIGNIFICANT CHANGE UP (ref 0–0.9)
MONOCYTES NFR BLD AUTO: 7.9 % — SIGNIFICANT CHANGE UP (ref 2–14)
MONOCYTES NFR BLD AUTO: 7.9 % — SIGNIFICANT CHANGE UP (ref 2–14)
NEUTROPHILS # BLD AUTO: 6.36 K/UL — SIGNIFICANT CHANGE UP (ref 1.8–7.4)
NEUTROPHILS # BLD AUTO: 6.36 K/UL — SIGNIFICANT CHANGE UP (ref 1.8–7.4)
NEUTROPHILS NFR BLD AUTO: 76.4 % — SIGNIFICANT CHANGE UP (ref 43–77)
NEUTROPHILS NFR BLD AUTO: 76.4 % — SIGNIFICANT CHANGE UP (ref 43–77)
NRBC # BLD: 0 /100 WBCS — SIGNIFICANT CHANGE UP (ref 0–0)
NRBC # BLD: 0 /100 WBCS — SIGNIFICANT CHANGE UP (ref 0–0)
PLATELET # BLD AUTO: 419 K/UL — HIGH (ref 150–400)
PLATELET # BLD AUTO: 419 K/UL — HIGH (ref 150–400)
POTASSIUM SERPL-MCNC: 4.2 MMOL/L — SIGNIFICANT CHANGE UP (ref 3.5–5.3)
POTASSIUM SERPL-MCNC: 4.2 MMOL/L — SIGNIFICANT CHANGE UP (ref 3.5–5.3)
POTASSIUM SERPL-SCNC: 4.2 MMOL/L — SIGNIFICANT CHANGE UP (ref 3.5–5.3)
POTASSIUM SERPL-SCNC: 4.2 MMOL/L — SIGNIFICANT CHANGE UP (ref 3.5–5.3)
PROT SERPL-MCNC: 8 GM/DL — SIGNIFICANT CHANGE UP (ref 6–8.3)
PROT SERPL-MCNC: 8 GM/DL — SIGNIFICANT CHANGE UP (ref 6–8.3)
PROTHROM AB SERPL-ACNC: 12.4 SEC — SIGNIFICANT CHANGE UP (ref 9.5–13)
PROTHROM AB SERPL-ACNC: 12.4 SEC — SIGNIFICANT CHANGE UP (ref 9.5–13)
RBC # BLD: 5.25 M/UL — HIGH (ref 3.8–5.2)
RBC # BLD: 5.25 M/UL — HIGH (ref 3.8–5.2)
RBC # FLD: 12.7 % — SIGNIFICANT CHANGE UP (ref 10.3–14.5)
RBC # FLD: 12.7 % — SIGNIFICANT CHANGE UP (ref 10.3–14.5)
SARS-COV-2 RNA SPEC QL NAA+PROBE: SIGNIFICANT CHANGE UP
SARS-COV-2 RNA SPEC QL NAA+PROBE: SIGNIFICANT CHANGE UP
SODIUM SERPL-SCNC: 137 MMOL/L — SIGNIFICANT CHANGE UP (ref 135–145)
SODIUM SERPL-SCNC: 137 MMOL/L — SIGNIFICANT CHANGE UP (ref 135–145)
WBC # BLD: 8.33 K/UL — SIGNIFICANT CHANGE UP (ref 3.8–10.5)
WBC # BLD: 8.33 K/UL — SIGNIFICANT CHANGE UP (ref 3.8–10.5)
WBC # FLD AUTO: 8.33 K/UL — SIGNIFICANT CHANGE UP (ref 3.8–10.5)
WBC # FLD AUTO: 8.33 K/UL — SIGNIFICANT CHANGE UP (ref 3.8–10.5)

## 2023-10-25 PROCEDURE — 99223 1ST HOSP IP/OBS HIGH 75: CPT

## 2023-10-25 PROCEDURE — 99285 EMERGENCY DEPT VISIT HI MDM: CPT

## 2023-10-25 PROCEDURE — 71275 CT ANGIOGRAPHY CHEST: CPT | Mod: 26,MA

## 2023-10-25 PROCEDURE — 71045 X-RAY EXAM CHEST 1 VIEW: CPT | Mod: 26

## 2023-10-25 PROCEDURE — 70450 CT HEAD/BRAIN W/O DYE: CPT | Mod: 26,MA

## 2023-10-25 PROCEDURE — 93010 ELECTROCARDIOGRAM REPORT: CPT

## 2023-10-25 PROCEDURE — 93970 EXTREMITY STUDY: CPT | Mod: 26

## 2023-10-25 RX ORDER — ENOXAPARIN SODIUM 100 MG/ML
30 INJECTION SUBCUTANEOUS
Qty: 0 | Refills: 0 | DISCHARGE

## 2023-10-25 RX ORDER — SODIUM CHLORIDE 9 MG/ML
1000 INJECTION, SOLUTION INTRAVENOUS
Refills: 0 | Status: DISCONTINUED | OUTPATIENT
Start: 2023-10-25 | End: 2023-10-30

## 2023-10-25 RX ORDER — INSULIN LISPRO 100/ML
VIAL (ML) SUBCUTANEOUS
Refills: 0 | Status: DISCONTINUED | OUTPATIENT
Start: 2023-10-25 | End: 2023-10-30

## 2023-10-25 RX ORDER — OXYCODONE AND ACETAMINOPHEN 5; 325 MG/1; MG/1
1 TABLET ORAL
Qty: 0 | Refills: 0 | DISCHARGE

## 2023-10-25 RX ORDER — LANOLIN ALCOHOL/MO/W.PET/CERES
3 CREAM (GRAM) TOPICAL AT BEDTIME
Refills: 0 | Status: DISCONTINUED | OUTPATIENT
Start: 2023-10-25 | End: 2023-10-30

## 2023-10-25 RX ORDER — INSULIN LISPRO 100/ML
VIAL (ML) SUBCUTANEOUS AT BEDTIME
Refills: 0 | Status: DISCONTINUED | OUTPATIENT
Start: 2023-10-25 | End: 2023-10-30

## 2023-10-25 RX ORDER — ASPIRIN/CALCIUM CARB/MAGNESIUM 324 MG
81 TABLET ORAL DAILY
Refills: 0 | Status: DISCONTINUED | OUTPATIENT
Start: 2023-10-25 | End: 2023-10-30

## 2023-10-25 RX ORDER — DEXTROSE 50 % IN WATER 50 %
15 SYRINGE (ML) INTRAVENOUS ONCE
Refills: 0 | Status: DISCONTINUED | OUTPATIENT
Start: 2023-10-25 | End: 2023-10-30

## 2023-10-25 RX ORDER — FOLIC ACID 0.8 MG
1 TABLET ORAL DAILY
Refills: 0 | Status: DISCONTINUED | OUTPATIENT
Start: 2023-10-25 | End: 2023-10-30

## 2023-10-25 RX ORDER — ENOXAPARIN SODIUM 100 MG/ML
40 INJECTION SUBCUTANEOUS EVERY 24 HOURS
Refills: 0 | Status: DISCONTINUED | OUTPATIENT
Start: 2023-10-25 | End: 2023-10-25

## 2023-10-25 RX ORDER — ACETAMINOPHEN 500 MG
650 TABLET ORAL EVERY 6 HOURS
Refills: 0 | Status: DISCONTINUED | OUTPATIENT
Start: 2023-10-25 | End: 2023-10-30

## 2023-10-25 RX ORDER — DEXTROSE 50 % IN WATER 50 %
12.5 SYRINGE (ML) INTRAVENOUS ONCE
Refills: 0 | Status: DISCONTINUED | OUTPATIENT
Start: 2023-10-25 | End: 2023-10-30

## 2023-10-25 RX ORDER — PANTOPRAZOLE SODIUM 20 MG/1
40 TABLET, DELAYED RELEASE ORAL
Refills: 0 | Status: DISCONTINUED | OUTPATIENT
Start: 2023-10-25 | End: 2023-10-30

## 2023-10-25 RX ORDER — FERROUS SULFATE 325(65) MG
325 TABLET ORAL DAILY
Refills: 0 | Status: DISCONTINUED | OUTPATIENT
Start: 2023-10-25 | End: 2023-10-30

## 2023-10-25 RX ORDER — ACETAMINOPHEN 500 MG
1000 TABLET ORAL ONCE
Refills: 0 | Status: COMPLETED | OUTPATIENT
Start: 2023-10-25 | End: 2023-10-25

## 2023-10-25 RX ORDER — GLUCAGON INJECTION, SOLUTION 0.5 MG/.1ML
1 INJECTION, SOLUTION SUBCUTANEOUS ONCE
Refills: 0 | Status: DISCONTINUED | OUTPATIENT
Start: 2023-10-25 | End: 2023-10-30

## 2023-10-25 RX ORDER — DEXTROSE 50 % IN WATER 50 %
25 SYRINGE (ML) INTRAVENOUS ONCE
Refills: 0 | Status: DISCONTINUED | OUTPATIENT
Start: 2023-10-25 | End: 2023-10-30

## 2023-10-25 RX ORDER — METOPROLOL TARTRATE 50 MG
25 TABLET ORAL DAILY
Refills: 0 | Status: DISCONTINUED | OUTPATIENT
Start: 2023-10-25 | End: 2023-10-30

## 2023-10-25 RX ORDER — ATORVASTATIN CALCIUM 80 MG/1
40 TABLET, FILM COATED ORAL AT BEDTIME
Refills: 0 | Status: DISCONTINUED | OUTPATIENT
Start: 2023-10-25 | End: 2023-10-30

## 2023-10-25 RX ORDER — ENOXAPARIN SODIUM 100 MG/ML
50 INJECTION SUBCUTANEOUS EVERY 12 HOURS
Refills: 0 | Status: DISCONTINUED | OUTPATIENT
Start: 2023-10-26 | End: 2023-10-30

## 2023-10-25 RX ADMIN — SODIUM CHLORIDE 125 MILLILITER(S): 9 INJECTION, SOLUTION INTRAVENOUS at 23:46

## 2023-10-25 RX ADMIN — Medication 400 MILLIGRAM(S): at 15:07

## 2023-10-25 RX ADMIN — ENOXAPARIN SODIUM 40 MILLIGRAM(S): 100 INJECTION SUBCUTANEOUS at 17:04

## 2023-10-25 RX ADMIN — Medication 3: at 17:03

## 2023-10-25 RX ADMIN — SODIUM CHLORIDE 125 MILLILITER(S): 9 INJECTION, SOLUTION INTRAVENOUS at 17:05

## 2023-10-25 RX ADMIN — Medication 2: at 22:01

## 2023-10-25 RX ADMIN — Medication 25 MILLIGRAM(S): at 17:04

## 2023-10-25 RX ADMIN — ATORVASTATIN CALCIUM 40 MILLIGRAM(S): 80 TABLET, FILM COATED ORAL at 22:00

## 2023-10-25 NOTE — ED ADULT NURSE NOTE - CHIEF COMPLAINT QUOTE
as per EMS " coming from a group home, sent by son  for a wellness check, refusing medical services  for 5  years. Group Home #[phone] " [ hx diabetes, HTN]

## 2023-10-25 NOTE — ED ADULT NURSE NOTE - OBJECTIVE STATEMENT
pt axox3 presents to the ED from group home c/o headache. Per aide, pt does not eat, drink or take care of herself. Denies having pmh/ taking medications. Pt states she has not seen pmd in 5 years and ants to be checked out. Denies sob, cp, dizziness, numbness, weakness, n/v

## 2023-10-25 NOTE — ED PROVIDER NOTE - PROGRESS NOTE DETAILS
Vilma [phone] is called sts pt lives in her house and pt is renting a room with few other people and [facility] pt's aide Vanessa is caring for the pt she does not know any thing. Vanessa [phone] is called and [facility] pt is not taking any meds and not eating not drinking.  Ms Esparza provided the health coordination agency [phone] is called no available but left message. [facility] Gaby clinical manager and Dorothy  [phone] ext [phone] called here sts pt has not seen any doctor for about 1 1/2 years and pt not taking any meds for hx of hypertension and diabetes pt has refusing care in this house by the aide they are not able to care for the pt. Dorothy diaz pt has no mental capacity to make decision and with minimal family support and pt has been expression she does not want to live.  is notified. Dr. Weeks is notified and admit pt.  is notified. Dr. Weeks is notified and admit pt. pt's brother Angelo [phone] was called and sts he is at work unable to talk.

## 2023-10-25 NOTE — PATIENT PROFILE ADULT - FALL HARM RISK - HARM RISK INTERVENTIONS

## 2023-10-25 NOTE — ED ADULT TRIAGE NOTE - CHIEF COMPLAINT QUOTE
as per EMS " coming from a group home, sent by son  for a wellness check, refusing medical services  for 5  years. Group Home #[phone] " [ hx diabetes, HTN]

## 2023-10-25 NOTE — ED PROVIDER NOTE - CLINICAL SUMMARY MEDICAL DECISION MAKING FREE TEXT BOX
pt came by ems from a " home" sent by home care  c/o not eating no drinking refuses home care and wants to die Pt is alert and follows verbal commends and pt came by ems from a " home" sent by home care  c/o not eating no drinking refuses home care and wants to die Pt is alert and follows verbal commends and but confused wbc normal h/h normal electrolytes and creatine.  is requesting pt to be admitted to a nursing home because the current home care is unable to manage pt.  ct head no acute finding pt came by ems from a " home" sent by home care  c/o not eating no drinking refuses home care and wants to die Pt is alert and follows verbal commends and but confused wbc normal h/h normal electrolytes and creatine.  is requesting pt to be admitted to a nursing home because the current home care is unable to manage pt.  ct head no acute finding ct angio chest no acute finding.   Long discussion with  owner and aide. cxr is read by me.

## 2023-10-25 NOTE — ED PROVIDER NOTE - OBJECTIVE STATEMENT
83 years old female by ems from a group home c/o headache, and ems sts pt has not seen and doctor for more than 5 years and want to have pt check up. Pt is alert and oriented x 3 c/o headache since this morning sts she has a hx of diabetes and she took her med unknown name and dose this morning. Pt denies recent hx of trauma, headache, dizziness, blurred visions, light sensitivities, focal/distal weakness or numbness, neck/back/hips/calfs pain, cough, sob, chest pain, nausea, vomiting, fever, chills, abd pain, dysuria or irregular bowel movements. 83 years old female by ems from a group home c/o headache, and ems sts pt has not seen and doctor for more than 5 years and want to have pt check up. Pt is alert and oriented x 3 c/o headache since this morning. Pt denies recent hx of trauma, headache, dizziness, blurred visions, light sensitivities, focal/distal weakness or numbness, neck/back/hips/calfs pain, cough, sob, chest pain, nausea, vomiting, fever, chills, abd pain, dysuria or irregular bowel movements.

## 2023-10-25 NOTE — H&P ADULT - NSHPPHYSICALEXAM_GEN_ALL_CORE
CONSTITUTIONAL: alert and cooperative, no acute distress.   EYES: PERRL,  no scleral icterus  ENT: Mucosa moist, tongue normal.   NECK: Neck supple, trachea midline, non-tender  CARDIAC: Normal S1 and S2. Regular rate and rhythms. No Pedal edema. Peripheral pulses intact  LUNGS: Equal air entry both lungs. No rales, rhonchi, wheezing. Normal respiratory effort.   ABDOMEN: Mild tenderness noted in left lateral quadrant. No guarding or rebound tenderness. No hepatomegaly or splenomegaly. Bowel sound normal.  MUSCULOSKELETAL: Normocephalic, atraumatic. No significant deformity or joint abnormality  NEUROLOGICAL: Move all extremities  SKIN: no lesions or eruptions. Decrease turgor  PSYCHIATRIC: A&O x 2, appropriate mood and affect.

## 2023-10-25 NOTE — ED PROVIDER NOTE - NSICDXPASTMEDICALHX_GEN_ALL_CORE_FT
PAST MEDICAL HISTORY:  Ankle fracture left -2005    Appendicitis     Bunion of left foot     Bunion of right foot     Cataract bilateral eyes    Diabetes mellitus, type 2     Dry eyes, bilateral     Ectopic pregnancy     Fibroids     Fibromyalgia     GERD (gastroesophageal reflux disease)     HLD (hyperlipidemia)     Hypertension     Lower back pain     Migraines     Primary osteoarthritis of left knee     RA (rheumatoid arthritis)     Retinopathy diabetic    TIA (transient ischemic attack) 2005, pt is taking aspirin 81 mg    TMJ (dislocation of temporomandibular joint) disorder-dx i 1996,  able to open mouth fully at time of exam

## 2023-10-25 NOTE — ED PROVIDER NOTE - MUSCULOSKELETAL, MLM
Spine appears normal, range of motion is not limited, no muscle or joint tenderness non tender to palp bilateral legs

## 2023-10-25 NOTE — H&P ADULT - PROBLEM SELECTOR PLAN 1
brought in to ED due to not eating and not taking medications  Clinically appear dry, dehydrated and malnourished  elevated ddimer--> CTA chest with no PE. Check LE doppler  positive serum acetone wit normal bicaro and normal AGAP,  likely due to fasting ketoacidosis. IV fluid, encourage oral intake  PT  and nutrition consult  Mild left mid abd tenderness---> CT abd/pelvis with contrast ( ordered for tomorrow, received IV contrast today)  UA pending  check B12, folate, TSH, free thyroxine

## 2023-10-25 NOTE — ED ADULT NURSE NOTE - NSICDXPASTSURGICALHX_GEN_ALL_CORE_FT
PAST SURGICAL HISTORY:  History of salpingo-oophorectomy right - after ectopic pregnancy    S/P appendectomy     S/P arthroscopy of left knee 2005    S/P bunionectomy right and left    S/P cataract extraction bilateral eyes with artificial lenses    S/P eye surgery bilateral    S/P hysterectomy with oophorectomy 1996    S/P tonsillectomy age 17

## 2023-10-25 NOTE — CHART NOTE - NSCHARTNOTEFT_GEN_A_CORE
Internal Medicine PA Chart Note:    HPI:  83 years old female with h/o HTN, HLD, DM was brought in to ED for medical evaluation. Per [facility] clinical manager, patient has not seen any docotro for more than 1 years. Per aid Vanessa [phone], patient has not been eating and not taking meds. Patient is renting a room with other people. Patient denied any nausea, vomiting, diarrhea, urinary symptoms. Patient is A&O x 2 and relatively a poor historian.   Tachycardic upon arrival, BP stable, afebrile, sat well at RA. EKG with sinus tachy. No leukocytosis, plt 419, ddimer 1627, K 4.2, Cr 0.75, glucose 289, serum acetone small. Flu/COVID negative. CT head with no acute pathology. CTA with no PE. No focal consolidation    SH: no toxic habits  FH: patient dose not know (25 Oct 2023 16:50)      Now was contacted by Radiology for critical findings for LE duplex--> (+) Occlusive left peroneal Vein DVT.   Patient was seen and examined at bedside. She was laying in bed comfortably on RA w/ no signs of distress.  She denied any feelings of anxiety, palpitations, SOB, CP, headaches, changes in her vision, no pain/tenderness/discomfort in her LE, or abdominal pain. On exam B/L LE were equal, no signs of swelling, edema, redness of the LLE, distal pulses present, and denied tenderness upon palpation of left calf, and no discomfort with extension/flexion of left ankle. Case discussed with nocturnist and he recommended to switching patient's ppx DVT coverage to full therapeutic dose.         T(C): 37.2 (10-25-23 @ 19:04), Max: 37.7 (10-25-23 @ 13:36)  HR: 94 (10-25-23 @ 19:04) (94 - 133)  BP: 132/66 (10-25-23 @ 19:04) (120/74 - 132/66)  RR: 18 (10-25-23 @ 19:04) (18 - 18)  SpO2: 97% (10-25-23 @ 19:04) (95% - 98%)    CONSTITUTIONAL: Well groomed, no apparent distress, brightly awake, in good spirits  EYES: PERRLA and symmetric, EOMI, No conjunctival or scleral injection, non-icteric  ENMT: Oral mucosa with moist membranes. Normal dentition;   RESP: No respiratory distress, no use of accessory muscles  CV: RRR, no MRG; no JVD; no peripheral edema  GI: Soft, NT, ND, no rebound, no guarding  MSK:   B/L LE equal, no swelling, redness, warmth, or tenderness to palpations, distal pulses in tact, no discomfort on extension/flexion of LLE  PSYCH: Appropriate insight/judgment;, mood and affect appropriate, recent/remote memory intact      Plan:  - Start therapeutic lovenox 50mg q12 starting tmw  - q4 pulse checks  - Rest of the plan per primary hospitalist      Indira Casas PA-C  10/25/23 8:40pm

## 2023-10-25 NOTE — ED PROVIDER NOTE - CONSTITUTIONAL, MLM
Well appearing, awake, alert, oriented to person, place, time/situation and in no apparent distress. Speaking in clear full sentences no nasal flaring no shoulders retractions no diaphoresis, smiling appears very comfortable + strong urine smell normal... Well appearing, awake, alert, oriented to person, place and in no apparent distress. Speaking in clear full sentences no nasal flaring no shoulders retractions no diaphoresis, smiling appears very comfortable + strong urine smell

## 2023-10-25 NOTE — ED PROVIDER NOTE - NSICDXFAMILYHX_GEN_ALL_CORE_FT
FAMILY HISTORY:  Diabetes mellitus, type 2    Mother  Still living? No  Cerebrovascular accident, Age at diagnosis: Age Unknown  Diabetes mellitus, type 2, Age at diagnosis: Age Unknown  Family history of cardiac disorder, Age at diagnosis: Age Unknown    Sibling  Still living? Yes, Estimated age: Age Unknown  Family history of asthma, Age at diagnosis: Age Unknown  Family history of breast cancer, Age at diagnosis: Age Unknown

## 2023-10-25 NOTE — H&P ADULT - HISTORY OF PRESENT ILLNESS
83 years old female with h/o HTN, HLD, DM was brought in to ED for medical evaluation. Per [facility] care clinical manager, patient has not seen any docotro for more than 1 years. Per aid Vanessa [phone], patient has not been eating and not taking meds. Patient is renting a room with other people. Patient denied any nausea, vomiting, diarrhea, urinary symptoms. Patient is A&O x 2 and relatively a poor historian.   Tachycardic upon arrival, BP stable, afebrile, sat well at RA. EKG with sinus tachy. No leukocytosis, plt 419, ddimer 1627, K 4.2, Cr 0.75, glucose 289, serum acetone small. Flu/COVID negative. CT head with no acute pathology. CTA with no PE. No focal consolidation    SH: no toxic habits  FH: patient dose not know

## 2023-10-25 NOTE — H&P ADULT - ASSESSMENT
83 years old female with h/o HTN, HLD, DM was brought in to ED for medical evaluation. Per [facility] clinical manager, patient has not seen any docotro for more than 1 years. Per aid Vanessa [phone], patient has not been eating and not taking meds. Patient is renting a room with other people. Patient denied any nausea, vomiting, diarrhea, urinary symptoms. Patient is A&O x 2 and relatively a poor historian.   Tachycardic upon arrival, BP stable, afebrile, sat well at RA. EKG with sinus tachy. No leukocytosis, plt 419, ddimer 1627, K 4.2, Cr 0.75, glucose 289, serum acetone small. Flu/COVID negative. CT head with no acute pathology. CTA with no PE. No focal consolidation

## 2023-10-26 DIAGNOSIS — I82.409 ACUTE EMBOLISM AND THROMBOSIS OF UNSPECIFIED DEEP VEINS OF UNSPECIFIED LOWER EXTREMITY: ICD-10-CM

## 2023-10-26 LAB
A1C WITH ESTIMATED AVERAGE GLUCOSE RESULT: 12.4 % — HIGH (ref 4–5.6)
A1C WITH ESTIMATED AVERAGE GLUCOSE RESULT: 12.4 % — HIGH (ref 4–5.6)
ALBUMIN SERPL ELPH-MCNC: 2.1 G/DL — LOW (ref 3.3–5)
ALBUMIN SERPL ELPH-MCNC: 2.1 G/DL — LOW (ref 3.3–5)
ALP SERPL-CCNC: 89 U/L — SIGNIFICANT CHANGE UP (ref 40–120)
ALP SERPL-CCNC: 89 U/L — SIGNIFICANT CHANGE UP (ref 40–120)
ALT FLD-CCNC: 12 U/L — SIGNIFICANT CHANGE UP (ref 12–78)
ALT FLD-CCNC: 12 U/L — SIGNIFICANT CHANGE UP (ref 12–78)
ANION GAP SERPL CALC-SCNC: 5 MMOL/L — SIGNIFICANT CHANGE UP (ref 5–17)
ANION GAP SERPL CALC-SCNC: 5 MMOL/L — SIGNIFICANT CHANGE UP (ref 5–17)
APPEARANCE UR: ABNORMAL
APPEARANCE UR: ABNORMAL
APTT BLD: 31.4 SEC — SIGNIFICANT CHANGE UP (ref 24.5–35.6)
APTT BLD: 31.4 SEC — SIGNIFICANT CHANGE UP (ref 24.5–35.6)
AST SERPL-CCNC: 14 U/L — LOW (ref 15–37)
AST SERPL-CCNC: 14 U/L — LOW (ref 15–37)
BACTERIA # UR AUTO: ABNORMAL
BACTERIA # UR AUTO: ABNORMAL
BILIRUB SERPL-MCNC: 0.4 MG/DL — SIGNIFICANT CHANGE UP (ref 0.2–1.2)
BILIRUB SERPL-MCNC: 0.4 MG/DL — SIGNIFICANT CHANGE UP (ref 0.2–1.2)
BILIRUB UR-MCNC: NEGATIVE — SIGNIFICANT CHANGE UP
BILIRUB UR-MCNC: NEGATIVE — SIGNIFICANT CHANGE UP
BUN SERPL-MCNC: 11 MG/DL — SIGNIFICANT CHANGE UP (ref 7–23)
BUN SERPL-MCNC: 11 MG/DL — SIGNIFICANT CHANGE UP (ref 7–23)
CALCIUM SERPL-MCNC: 8.9 MG/DL — SIGNIFICANT CHANGE UP (ref 8.5–10.1)
CALCIUM SERPL-MCNC: 8.9 MG/DL — SIGNIFICANT CHANGE UP (ref 8.5–10.1)
CHLORIDE SERPL-SCNC: 105 MMOL/L — SIGNIFICANT CHANGE UP (ref 96–108)
CHLORIDE SERPL-SCNC: 105 MMOL/L — SIGNIFICANT CHANGE UP (ref 96–108)
CHOLEST SERPL-MCNC: 173 MG/DL — SIGNIFICANT CHANGE UP
CHOLEST SERPL-MCNC: 173 MG/DL — SIGNIFICANT CHANGE UP
CO2 SERPL-SCNC: 27 MMOL/L — SIGNIFICANT CHANGE UP (ref 22–31)
CO2 SERPL-SCNC: 27 MMOL/L — SIGNIFICANT CHANGE UP (ref 22–31)
COLOR SPEC: YELLOW — SIGNIFICANT CHANGE UP
COLOR SPEC: YELLOW — SIGNIFICANT CHANGE UP
COMMENT - URINE: SIGNIFICANT CHANGE UP
COMMENT - URINE: SIGNIFICANT CHANGE UP
CREAT SERPL-MCNC: 0.47 MG/DL — LOW (ref 0.5–1.3)
CREAT SERPL-MCNC: 0.47 MG/DL — LOW (ref 0.5–1.3)
DIFF PNL FLD: NEGATIVE — SIGNIFICANT CHANGE UP
DIFF PNL FLD: NEGATIVE — SIGNIFICANT CHANGE UP
EGFR: 96 ML/MIN/1.73M2 — SIGNIFICANT CHANGE UP
EGFR: 96 ML/MIN/1.73M2 — SIGNIFICANT CHANGE UP
EPI CELLS # UR: SIGNIFICANT CHANGE UP
EPI CELLS # UR: SIGNIFICANT CHANGE UP
ESTIMATED AVERAGE GLUCOSE: 309 MG/DL — HIGH (ref 68–114)
ESTIMATED AVERAGE GLUCOSE: 309 MG/DL — HIGH (ref 68–114)
FOLATE SERPL-MCNC: 13.4 NG/ML — SIGNIFICANT CHANGE UP
FOLATE SERPL-MCNC: 13.4 NG/ML — SIGNIFICANT CHANGE UP
GLUCOSE BLDC GLUCOMTR-MCNC: 230 MG/DL — HIGH (ref 70–99)
GLUCOSE BLDC GLUCOMTR-MCNC: 230 MG/DL — HIGH (ref 70–99)
GLUCOSE BLDC GLUCOMTR-MCNC: 245 MG/DL — HIGH (ref 70–99)
GLUCOSE BLDC GLUCOMTR-MCNC: 245 MG/DL — HIGH (ref 70–99)
GLUCOSE BLDC GLUCOMTR-MCNC: 261 MG/DL — HIGH (ref 70–99)
GLUCOSE BLDC GLUCOMTR-MCNC: 261 MG/DL — HIGH (ref 70–99)
GLUCOSE BLDC GLUCOMTR-MCNC: 287 MG/DL — HIGH (ref 70–99)
GLUCOSE BLDC GLUCOMTR-MCNC: 287 MG/DL — HIGH (ref 70–99)
GLUCOSE SERPL-MCNC: 250 MG/DL — HIGH (ref 70–99)
GLUCOSE SERPL-MCNC: 250 MG/DL — HIGH (ref 70–99)
GLUCOSE UR QL: 1000 MG/DL
GLUCOSE UR QL: 1000 MG/DL
HCT VFR BLD CALC: 43.3 % — SIGNIFICANT CHANGE UP (ref 34.5–45)
HCT VFR BLD CALC: 43.3 % — SIGNIFICANT CHANGE UP (ref 34.5–45)
HDLC SERPL-MCNC: 38 MG/DL — LOW
HDLC SERPL-MCNC: 38 MG/DL — LOW
HGB BLD-MCNC: 14.1 G/DL — SIGNIFICANT CHANGE UP (ref 11.5–15.5)
HGB BLD-MCNC: 14.1 G/DL — SIGNIFICANT CHANGE UP (ref 11.5–15.5)
INR BLD: 1.04 RATIO — SIGNIFICANT CHANGE UP (ref 0.85–1.18)
INR BLD: 1.04 RATIO — SIGNIFICANT CHANGE UP (ref 0.85–1.18)
KETONES UR-MCNC: ABNORMAL
KETONES UR-MCNC: ABNORMAL
LEUKOCYTE ESTERASE UR-ACNC: NEGATIVE — SIGNIFICANT CHANGE UP
LEUKOCYTE ESTERASE UR-ACNC: NEGATIVE — SIGNIFICANT CHANGE UP
LIPID PNL WITH DIRECT LDL SERPL: 117 MG/DL — HIGH
LIPID PNL WITH DIRECT LDL SERPL: 117 MG/DL — HIGH
MAGNESIUM SERPL-MCNC: 1.7 MG/DL — SIGNIFICANT CHANGE UP (ref 1.6–2.6)
MAGNESIUM SERPL-MCNC: 1.7 MG/DL — SIGNIFICANT CHANGE UP (ref 1.6–2.6)
MCHC RBC-ENTMCNC: 29.2 PG — SIGNIFICANT CHANGE UP (ref 27–34)
MCHC RBC-ENTMCNC: 29.2 PG — SIGNIFICANT CHANGE UP (ref 27–34)
MCHC RBC-ENTMCNC: 32.6 G/DL — SIGNIFICANT CHANGE UP (ref 32–36)
MCHC RBC-ENTMCNC: 32.6 G/DL — SIGNIFICANT CHANGE UP (ref 32–36)
MCV RBC AUTO: 89.6 FL — SIGNIFICANT CHANGE UP (ref 80–100)
MCV RBC AUTO: 89.6 FL — SIGNIFICANT CHANGE UP (ref 80–100)
NITRITE UR-MCNC: NEGATIVE — SIGNIFICANT CHANGE UP
NITRITE UR-MCNC: NEGATIVE — SIGNIFICANT CHANGE UP
NON HDL CHOLESTEROL: 135 MG/DL — HIGH
NON HDL CHOLESTEROL: 135 MG/DL — HIGH
NRBC # BLD: 0 /100 WBCS — SIGNIFICANT CHANGE UP (ref 0–0)
NRBC # BLD: 0 /100 WBCS — SIGNIFICANT CHANGE UP (ref 0–0)
PH UR: 5 — SIGNIFICANT CHANGE UP (ref 5–8)
PH UR: 5 — SIGNIFICANT CHANGE UP (ref 5–8)
PHOSPHATE SERPL-MCNC: 2.1 MG/DL — LOW (ref 2.5–4.5)
PHOSPHATE SERPL-MCNC: 2.1 MG/DL — LOW (ref 2.5–4.5)
PLATELET # BLD AUTO: 380 K/UL — SIGNIFICANT CHANGE UP (ref 150–400)
PLATELET # BLD AUTO: 380 K/UL — SIGNIFICANT CHANGE UP (ref 150–400)
POTASSIUM SERPL-MCNC: 4.2 MMOL/L — SIGNIFICANT CHANGE UP (ref 3.5–5.3)
POTASSIUM SERPL-MCNC: 4.2 MMOL/L — SIGNIFICANT CHANGE UP (ref 3.5–5.3)
POTASSIUM SERPL-SCNC: 4.2 MMOL/L — SIGNIFICANT CHANGE UP (ref 3.5–5.3)
POTASSIUM SERPL-SCNC: 4.2 MMOL/L — SIGNIFICANT CHANGE UP (ref 3.5–5.3)
PROT SERPL-MCNC: 6.9 GM/DL — SIGNIFICANT CHANGE UP (ref 6–8.3)
PROT SERPL-MCNC: 6.9 GM/DL — SIGNIFICANT CHANGE UP (ref 6–8.3)
PROT UR-MCNC: 15 MG/DL
PROT UR-MCNC: 15 MG/DL
PROTHROM AB SERPL-ACNC: 12.4 SEC — SIGNIFICANT CHANGE UP (ref 9.5–13)
PROTHROM AB SERPL-ACNC: 12.4 SEC — SIGNIFICANT CHANGE UP (ref 9.5–13)
RBC # BLD: 4.83 M/UL — SIGNIFICANT CHANGE UP (ref 3.8–5.2)
RBC # BLD: 4.83 M/UL — SIGNIFICANT CHANGE UP (ref 3.8–5.2)
RBC # FLD: 12.6 % — SIGNIFICANT CHANGE UP (ref 10.3–14.5)
RBC # FLD: 12.6 % — SIGNIFICANT CHANGE UP (ref 10.3–14.5)
RBC CASTS # UR COMP ASSIST: ABNORMAL /HPF (ref 0–4)
RBC CASTS # UR COMP ASSIST: ABNORMAL /HPF (ref 0–4)
SODIUM SERPL-SCNC: 137 MMOL/L — SIGNIFICANT CHANGE UP (ref 135–145)
SODIUM SERPL-SCNC: 137 MMOL/L — SIGNIFICANT CHANGE UP (ref 135–145)
SP GR SPEC: 1.02 — SIGNIFICANT CHANGE UP (ref 1.01–1.02)
SP GR SPEC: 1.02 — SIGNIFICANT CHANGE UP (ref 1.01–1.02)
T4 FREE SERPL-MCNC: 1.3 NG/DL — SIGNIFICANT CHANGE UP (ref 0.9–1.8)
T4 FREE SERPL-MCNC: 1.3 NG/DL — SIGNIFICANT CHANGE UP (ref 0.9–1.8)
TRIGL SERPL-MCNC: 96 MG/DL — SIGNIFICANT CHANGE UP
TRIGL SERPL-MCNC: 96 MG/DL — SIGNIFICANT CHANGE UP
TSH SERPL-MCNC: 1.16 UU/ML — SIGNIFICANT CHANGE UP (ref 0.36–3.74)
TSH SERPL-MCNC: 1.16 UU/ML — SIGNIFICANT CHANGE UP (ref 0.36–3.74)
UROBILINOGEN FLD QL: 1 MG/DL
UROBILINOGEN FLD QL: 1 MG/DL
VIT B12 SERPL-MCNC: 313 PG/ML — SIGNIFICANT CHANGE UP (ref 232–1245)
VIT B12 SERPL-MCNC: 313 PG/ML — SIGNIFICANT CHANGE UP (ref 232–1245)
WBC # BLD: 6.31 K/UL — SIGNIFICANT CHANGE UP (ref 3.8–10.5)
WBC # BLD: 6.31 K/UL — SIGNIFICANT CHANGE UP (ref 3.8–10.5)
WBC # FLD AUTO: 6.31 K/UL — SIGNIFICANT CHANGE UP (ref 3.8–10.5)
WBC # FLD AUTO: 6.31 K/UL — SIGNIFICANT CHANGE UP (ref 3.8–10.5)
WBC UR QL: SIGNIFICANT CHANGE UP
WBC UR QL: SIGNIFICANT CHANGE UP

## 2023-10-26 PROCEDURE — 99232 SBSQ HOSP IP/OBS MODERATE 35: CPT

## 2023-10-26 PROCEDURE — 74177 CT ABD & PELVIS W/CONTRAST: CPT | Mod: 26

## 2023-10-26 RX ADMIN — ENOXAPARIN SODIUM 50 MILLIGRAM(S): 100 INJECTION SUBCUTANEOUS at 05:45

## 2023-10-26 RX ADMIN — PANTOPRAZOLE SODIUM 40 MILLIGRAM(S): 20 TABLET, DELAYED RELEASE ORAL at 09:09

## 2023-10-26 RX ADMIN — Medication 3: at 16:48

## 2023-10-26 RX ADMIN — Medication 2: at 08:44

## 2023-10-26 RX ADMIN — SODIUM CHLORIDE 125 MILLILITER(S): 9 INJECTION, SOLUTION INTRAVENOUS at 21:57

## 2023-10-26 RX ADMIN — Medication 1 MILLIGRAM(S): at 12:13

## 2023-10-26 RX ADMIN — ENOXAPARIN SODIUM 50 MILLIGRAM(S): 100 INJECTION SUBCUTANEOUS at 18:16

## 2023-10-26 RX ADMIN — SODIUM CHLORIDE 125 MILLILITER(S): 9 INJECTION, SOLUTION INTRAVENOUS at 16:45

## 2023-10-26 RX ADMIN — Medication 325 MILLIGRAM(S): at 12:13

## 2023-10-26 RX ADMIN — Medication 81 MILLIGRAM(S): at 12:13

## 2023-10-26 RX ADMIN — Medication 2: at 12:12

## 2023-10-26 RX ADMIN — Medication 1: at 21:55

## 2023-10-26 RX ADMIN — ATORVASTATIN CALCIUM 40 MILLIGRAM(S): 80 TABLET, FILM COATED ORAL at 21:55

## 2023-10-26 RX ADMIN — Medication 25 MILLIGRAM(S): at 05:45

## 2023-10-26 NOTE — PHYSICAL THERAPY INITIAL EVALUATION ADULT - PERTINENT HX OF CURRENT PROBLEM, REHAB EVAL
Pt participates in pharmacy class. Patient admitted with not eating or taking medication for medical evaluation. Patient found to have L below knee peroneal vein DVT on therapeutic dose of lovenox.

## 2023-10-27 LAB
CULTURE RESULTS: SIGNIFICANT CHANGE UP
CULTURE RESULTS: SIGNIFICANT CHANGE UP
GLUCOSE BLDC GLUCOMTR-MCNC: 198 MG/DL — HIGH (ref 70–99)
GLUCOSE BLDC GLUCOMTR-MCNC: 198 MG/DL — HIGH (ref 70–99)
GLUCOSE BLDC GLUCOMTR-MCNC: 233 MG/DL — HIGH (ref 70–99)
GLUCOSE BLDC GLUCOMTR-MCNC: 233 MG/DL — HIGH (ref 70–99)
GLUCOSE BLDC GLUCOMTR-MCNC: 269 MG/DL — HIGH (ref 70–99)
GLUCOSE BLDC GLUCOMTR-MCNC: 269 MG/DL — HIGH (ref 70–99)
GLUCOSE BLDC GLUCOMTR-MCNC: 275 MG/DL — HIGH (ref 70–99)
GLUCOSE BLDC GLUCOMTR-MCNC: 275 MG/DL — HIGH (ref 70–99)
SPECIMEN SOURCE: SIGNIFICANT CHANGE UP
SPECIMEN SOURCE: SIGNIFICANT CHANGE UP

## 2023-10-27 PROCEDURE — 99232 SBSQ HOSP IP/OBS MODERATE 35: CPT

## 2023-10-27 RX ORDER — INSULIN LISPRO 100/ML
4 VIAL (ML) SUBCUTANEOUS
Refills: 0 | Status: DISCONTINUED | OUTPATIENT
Start: 2023-10-27 | End: 2023-10-30

## 2023-10-27 RX ORDER — POLYETHYLENE GLYCOL 3350 17 G/17G
17 POWDER, FOR SOLUTION ORAL DAILY
Refills: 0 | Status: DISCONTINUED | OUTPATIENT
Start: 2023-10-27 | End: 2023-10-30

## 2023-10-27 RX ORDER — INSULIN GLARGINE 100 [IU]/ML
10 INJECTION, SOLUTION SUBCUTANEOUS AT BEDTIME
Refills: 0 | Status: DISCONTINUED | OUTPATIENT
Start: 2023-10-27 | End: 2023-10-30

## 2023-10-27 RX ORDER — SENNA PLUS 8.6 MG/1
2 TABLET ORAL AT BEDTIME
Refills: 0 | Status: DISCONTINUED | OUTPATIENT
Start: 2023-10-27 | End: 2023-10-30

## 2023-10-27 RX ADMIN — Medication 325 MILLIGRAM(S): at 12:03

## 2023-10-27 RX ADMIN — SODIUM CHLORIDE 125 MILLILITER(S): 9 INJECTION, SOLUTION INTRAVENOUS at 22:17

## 2023-10-27 RX ADMIN — PANTOPRAZOLE SODIUM 40 MILLIGRAM(S): 20 TABLET, DELAYED RELEASE ORAL at 08:17

## 2023-10-27 RX ADMIN — Medication 25 MILLIGRAM(S): at 05:41

## 2023-10-27 RX ADMIN — Medication 81 MILLIGRAM(S): at 12:03

## 2023-10-27 RX ADMIN — ATORVASTATIN CALCIUM 40 MILLIGRAM(S): 80 TABLET, FILM COATED ORAL at 21:44

## 2023-10-27 RX ADMIN — ENOXAPARIN SODIUM 50 MILLIGRAM(S): 100 INJECTION SUBCUTANEOUS at 05:42

## 2023-10-27 RX ADMIN — SODIUM CHLORIDE 125 MILLILITER(S): 9 INJECTION, SOLUTION INTRAVENOUS at 05:42

## 2023-10-27 RX ADMIN — Medication 3: at 12:02

## 2023-10-27 RX ADMIN — Medication 1: at 21:43

## 2023-10-27 RX ADMIN — Medication 2: at 17:38

## 2023-10-27 RX ADMIN — ENOXAPARIN SODIUM 50 MILLIGRAM(S): 100 INJECTION SUBCUTANEOUS at 17:39

## 2023-10-27 RX ADMIN — SENNA PLUS 2 TABLET(S): 8.6 TABLET ORAL at 21:44

## 2023-10-27 RX ADMIN — INSULIN GLARGINE 10 UNIT(S): 100 INJECTION, SOLUTION SUBCUTANEOUS at 22:20

## 2023-10-27 RX ADMIN — Medication 1 MILLIGRAM(S): at 12:03

## 2023-10-27 RX ADMIN — Medication 1: at 08:17

## 2023-10-27 RX ADMIN — SODIUM CHLORIDE 125 MILLILITER(S): 9 INJECTION, SOLUTION INTRAVENOUS at 17:57

## 2023-10-27 NOTE — DIETITIAN INITIAL EVALUATION ADULT - PERTINENT LABORATORY DATA
10-26    137  |  105  |  11  ----------------------------<  250<H>  4.2   |  27  |  0.47<L>    Ca    8.9      26 Oct 2023 06:13  Phos  2.1     10-26  Mg     1.7     10-26    TPro  6.9  /  Alb  2.1<L>  /  TBili  0.4  /  DBili  x   /  AST  14<L>  /  ALT  12  /  AlkPhos  89  10-26  POCT Blood Glucose.: 269 mg/dL (10-27-23 @ 11:13)  A1C with Estimated Average Glucose Result: 12.4 % (10-26-23 @ 06:13)

## 2023-10-27 NOTE — DIETITIAN INITIAL EVALUATION ADULT - OTHER INFO
Pt appears to be poor historian; unable to obtain reliable information regarding diet and wt hx.  Per medical record, pt rents room in a house with four other adults, whom pt is not related to. Pt has HHA 4 hrs x 6 days. Per H&P, aide reports pt not eating or taking her medications PTA, however timeframe not specified; with FTT dx. Pt with uncontrolled T2DM; MtdY5z=00.4% likely due to not taking medications. Per home medications list, pt is supposed to be taking Metformin and Januvia to control her BG levels at home.  Pt consuming 26-75% of documented meals during admission as per flow sheets; observed pt ate approximately 25% of her lunch today (10/27). Appears to be tolerating easy to chew consistency. Recommend change diet to consistent CHO given pt's uncontrolled DM status, and also add Glucerna nutrition supplements to optimize PO intake.  RD remains available. Pt appears to be poor historian; unable to obtain reliable information regarding diet and wt hx.  Per medical record, pt rents room in a house with four other adults, whom pt is not related to. Pt has HHA 4 hrs x 6 days. SW consult pending; likely needs placement/rehab. Per H&P, aide reports pt not eating or taking her medications PTA, however timeframe not specified; with FTT dx. Pt with uncontrolled T2DM; OdqH3z=51.4% likely due to not taking medications. Per home medications list, pt is supposed to be taking Metformin and Januvia to control her BG levels at home.  Pt consuming 26-75% of documented meals during admission as per flow sheets; observed pt ate approximately 25% of her lunch today (10/27). Appears to be tolerating easy to chew consistency. Recommend change diet to consistent CHO given pt's uncontrolled DM status, and also add Glucerna nutrition supplements to optimize PO intake.  RD remains available.

## 2023-10-27 NOTE — DIETITIAN INITIAL EVALUATION ADULT - PERTINENT MEDS FT
MEDICATIONS  (STANDING):  aspirin enteric coated 81 milliGRAM(s) Oral daily  atorvastatin 40 milliGRAM(s) Oral at bedtime  dextrose 5%. 1000 milliLiter(s) (100 mL/Hr) IV Continuous <Continuous>  dextrose 5%. 1000 milliLiter(s) (50 mL/Hr) IV Continuous <Continuous>  dextrose 50% Injectable 25 Gram(s) IV Push once  dextrose 50% Injectable 12.5 Gram(s) IV Push once  dextrose 50% Injectable 25 Gram(s) IV Push once  enoxaparin Injectable 50 milliGRAM(s) SubCutaneous every 12 hours  ferrous    sulfate 325 milliGRAM(s) Oral daily  folic acid 1 milliGRAM(s) Oral daily  glucagon  Injectable 1 milliGRAM(s) IntraMuscular once  insulin lispro (ADMELOG) corrective regimen sliding scale   SubCutaneous three times a day before meals  insulin lispro (ADMELOG) corrective regimen sliding scale   SubCutaneous at bedtime  lactated ringers. 1000 milliLiter(s) (125 mL/Hr) IV Continuous <Continuous>  metoprolol succinate ER 25 milliGRAM(s) Oral daily  pantoprazole    Tablet 40 milliGRAM(s) Oral before breakfast    MEDICATIONS  (PRN):  acetaminophen     Tablet .. 650 milliGRAM(s) Oral every 6 hours PRN Mild Pain (1 - 3), Moderate Pain (4 - 6)  dextrose Oral Gel 15 Gram(s) Oral once PRN Blood Glucose LESS THAN 70 milliGRAM(s)/deciliter  melatonin 3 milliGRAM(s) Oral at bedtime PRN Insomnia

## 2023-10-27 NOTE — DIETITIAN NUTRITION RISK NOTIFICATION - TREATMENT: THE FOLLOWING DIET HAS BEEN RECOMMENDED
Diet, Easy to Chew:   Consistent Carbohydrate {No Snacks}  Supplement Feeding Modality:  Oral  Glucerna Shake Cans or Servings Per Day:  1       Frequency:  Three Times a day (10-27-23 @ 15:35) [Pending Verification By Attending]  Diet, Easy to Chew (10-25-23 @ 16:23) [Active]

## 2023-10-28 LAB
GLUCOSE BLDC GLUCOMTR-MCNC: 153 MG/DL — HIGH (ref 70–99)
GLUCOSE BLDC GLUCOMTR-MCNC: 153 MG/DL — HIGH (ref 70–99)
GLUCOSE BLDC GLUCOMTR-MCNC: 158 MG/DL — HIGH (ref 70–99)
GLUCOSE BLDC GLUCOMTR-MCNC: 158 MG/DL — HIGH (ref 70–99)
GLUCOSE BLDC GLUCOMTR-MCNC: 181 MG/DL — HIGH (ref 70–99)
GLUCOSE BLDC GLUCOMTR-MCNC: 181 MG/DL — HIGH (ref 70–99)
GLUCOSE BLDC GLUCOMTR-MCNC: 258 MG/DL — HIGH (ref 70–99)
GLUCOSE BLDC GLUCOMTR-MCNC: 258 MG/DL — HIGH (ref 70–99)

## 2023-10-28 PROCEDURE — 99232 SBSQ HOSP IP/OBS MODERATE 35: CPT

## 2023-10-28 RX ADMIN — Medication 1: at 17:12

## 2023-10-28 RX ADMIN — Medication 4 UNIT(S): at 12:44

## 2023-10-28 RX ADMIN — ENOXAPARIN SODIUM 50 MILLIGRAM(S): 100 INJECTION SUBCUTANEOUS at 17:12

## 2023-10-28 RX ADMIN — ENOXAPARIN SODIUM 50 MILLIGRAM(S): 100 INJECTION SUBCUTANEOUS at 05:07

## 2023-10-28 RX ADMIN — SODIUM CHLORIDE 125 MILLILITER(S): 9 INJECTION, SOLUTION INTRAVENOUS at 17:11

## 2023-10-28 RX ADMIN — Medication 4 UNIT(S): at 17:13

## 2023-10-28 RX ADMIN — Medication 4 UNIT(S): at 08:09

## 2023-10-28 RX ADMIN — INSULIN GLARGINE 10 UNIT(S): 100 INJECTION, SOLUTION SUBCUTANEOUS at 21:28

## 2023-10-28 RX ADMIN — Medication 25 MILLIGRAM(S): at 05:07

## 2023-10-28 RX ADMIN — Medication 1: at 12:44

## 2023-10-28 RX ADMIN — ATORVASTATIN CALCIUM 40 MILLIGRAM(S): 80 TABLET, FILM COATED ORAL at 21:22

## 2023-10-28 RX ADMIN — Medication 325 MILLIGRAM(S): at 12:45

## 2023-10-28 RX ADMIN — SENNA PLUS 2 TABLET(S): 8.6 TABLET ORAL at 21:22

## 2023-10-28 RX ADMIN — Medication 1 MILLIGRAM(S): at 12:45

## 2023-10-28 RX ADMIN — PANTOPRAZOLE SODIUM 40 MILLIGRAM(S): 20 TABLET, DELAYED RELEASE ORAL at 08:08

## 2023-10-28 RX ADMIN — Medication 81 MILLIGRAM(S): at 12:45

## 2023-10-28 RX ADMIN — SODIUM CHLORIDE 125 MILLILITER(S): 9 INJECTION, SOLUTION INTRAVENOUS at 08:22

## 2023-10-28 RX ADMIN — Medication 3: at 08:09

## 2023-10-29 LAB
GLUCOSE BLDC GLUCOMTR-MCNC: 104 MG/DL — HIGH (ref 70–99)
GLUCOSE BLDC GLUCOMTR-MCNC: 104 MG/DL — HIGH (ref 70–99)
GLUCOSE BLDC GLUCOMTR-MCNC: 144 MG/DL — HIGH (ref 70–99)
GLUCOSE BLDC GLUCOMTR-MCNC: 144 MG/DL — HIGH (ref 70–99)
GLUCOSE BLDC GLUCOMTR-MCNC: 208 MG/DL — HIGH (ref 70–99)
GLUCOSE BLDC GLUCOMTR-MCNC: 208 MG/DL — HIGH (ref 70–99)
GLUCOSE BLDC GLUCOMTR-MCNC: 292 MG/DL — HIGH (ref 70–99)
GLUCOSE BLDC GLUCOMTR-MCNC: 292 MG/DL — HIGH (ref 70–99)
GLUCOSE BLDC GLUCOMTR-MCNC: 99 MG/DL — SIGNIFICANT CHANGE UP (ref 70–99)
GLUCOSE BLDC GLUCOMTR-MCNC: 99 MG/DL — SIGNIFICANT CHANGE UP (ref 70–99)

## 2023-10-29 PROCEDURE — 99232 SBSQ HOSP IP/OBS MODERATE 35: CPT

## 2023-10-29 RX ADMIN — Medication 4 UNIT(S): at 18:55

## 2023-10-29 RX ADMIN — Medication 81 MILLIGRAM(S): at 14:38

## 2023-10-29 RX ADMIN — PANTOPRAZOLE SODIUM 40 MILLIGRAM(S): 20 TABLET, DELAYED RELEASE ORAL at 10:12

## 2023-10-29 RX ADMIN — ENOXAPARIN SODIUM 50 MILLIGRAM(S): 100 INJECTION SUBCUTANEOUS at 05:10

## 2023-10-29 RX ADMIN — ATORVASTATIN CALCIUM 40 MILLIGRAM(S): 80 TABLET, FILM COATED ORAL at 21:27

## 2023-10-29 RX ADMIN — SENNA PLUS 2 TABLET(S): 8.6 TABLET ORAL at 21:27

## 2023-10-29 RX ADMIN — Medication 1 MILLIGRAM(S): at 14:38

## 2023-10-29 RX ADMIN — SODIUM CHLORIDE 125 MILLILITER(S): 9 INJECTION, SOLUTION INTRAVENOUS at 05:10

## 2023-10-29 RX ADMIN — INSULIN GLARGINE 10 UNIT(S): 100 INJECTION, SOLUTION SUBCUTANEOUS at 21:36

## 2023-10-29 RX ADMIN — Medication 25 MILLIGRAM(S): at 05:10

## 2023-10-29 RX ADMIN — Medication 4 UNIT(S): at 10:12

## 2023-10-29 RX ADMIN — Medication 325 MILLIGRAM(S): at 18:49

## 2023-10-29 RX ADMIN — Medication 3: at 14:36

## 2023-10-29 RX ADMIN — ENOXAPARIN SODIUM 50 MILLIGRAM(S): 100 INJECTION SUBCUTANEOUS at 18:48

## 2023-10-29 RX ADMIN — Medication 4 UNIT(S): at 14:37

## 2023-10-29 NOTE — PROGRESS NOTE ADULT - PROBLEM SELECTOR PLAN 3
appear malnourished  encourage oral intake  Nutrition consult

## 2023-10-29 NOTE — PROGRESS NOTE ADULT - PROBLEM SELECTOR PLAN 6
ISS, hypoglycemia protocol, add basal  bolus regimen  A1c 12.4
ISS, hypoglycemia protocol, added basal  bolus regimen  A1c 12.4
ISS, hypoglycemia protocol  A1c
ISS, hypoglycemia protocol, added basal  bolus regimen  A1c 12.4

## 2023-10-29 NOTE — PROGRESS NOTE ADULT - SUBJECTIVE AND OBJECTIVE BOX
Patient is a 83y old  Female who presents with a chief complaint of ALTERED MENTAL STATUS (27 Oct 2023 15:12)      INTERVAL HPI/OVERNIGHT EVENTS:  Pt was seen and examined, no acute events.      MEDICATIONS  (STANDING):  aspirin enteric coated 81 milliGRAM(s) Oral daily  atorvastatin 40 milliGRAM(s) Oral at bedtime  dextrose 5%. 1000 milliLiter(s) (100 mL/Hr) IV Continuous <Continuous>  dextrose 5%. 1000 milliLiter(s) (50 mL/Hr) IV Continuous <Continuous>  dextrose 50% Injectable 25 Gram(s) IV Push once  dextrose 50% Injectable 12.5 Gram(s) IV Push once  dextrose 50% Injectable 25 Gram(s) IV Push once  enoxaparin Injectable 50 milliGRAM(s) SubCutaneous every 12 hours  ferrous    sulfate 325 milliGRAM(s) Oral daily  folic acid 1 milliGRAM(s) Oral daily  glucagon  Injectable 1 milliGRAM(s) IntraMuscular once  insulin lispro (ADMELOG) corrective regimen sliding scale   SubCutaneous three times a day before meals  insulin lispro (ADMELOG) corrective regimen sliding scale   SubCutaneous at bedtime  lactated ringers. 1000 milliLiter(s) (125 mL/Hr) IV Continuous <Continuous>  metoprolol succinate ER 25 milliGRAM(s) Oral daily  pantoprazole    Tablet 40 milliGRAM(s) Oral before breakfast    MEDICATIONS  (PRN):  acetaminophen     Tablet .. 650 milliGRAM(s) Oral every 6 hours PRN Mild Pain (1 - 3), Moderate Pain (4 - 6)  dextrose Oral Gel 15 Gram(s) Oral once PRN Blood Glucose LESS THAN 70 milliGRAM(s)/deciliter  melatonin 3 milliGRAM(s) Oral at bedtime PRN Insomnia      Allergies    ACE inhibitors (Angioedema)  tetanus toxoid (Short breath)    Intolerances          Vital Signs Last 24 Hrs  T(C): 37.6 (27 Oct 2023 17:25), Max: 37.6 (26 Oct 2023 23:07)  T(F): 99.6 (27 Oct 2023 17:25), Max: 99.6 (26 Oct 2023 23:07)  HR: 80 (27 Oct 2023 17:25) (68 - 98)  BP: 145/79 (27 Oct 2023 17:25) (109/63 - 145/79)  BP(mean): --  RR: 18 (27 Oct 2023 17:25) (18 - 18)  SpO2: 99% (27 Oct 2023 17:25) (95% - 99%)        PHYSICAL EXAM:  GENERAL: NAD  HEAD:  Atraumatic  EYES: PERRLA  ENMT: Mouth moist   NECK: Supple  NERVOUS SYSTEM:  Awake, alert  CHEST/LUNG: Clear  HEART: RRR, S1, S2  ABDOMEN: Soft, non tender  EXTREMITIES:  no edema BL LE  SKIN: No rash        LABS:                        14.1   6.31  )-----------( 380      ( 26 Oct 2023 06:13 )             43.3     10    137  |  105  |  11  ----------------------------<  250<H>  4.2   |  27  |  0.47<L>    Ca    8.9      26 Oct 2023 06:13  Phos  2.1     10-26  Mg     1.7     10-26    TPro  6.9  /  Alb  2.1<L>  /  TBili  0.4  /  DBili  x   /  AST  14<L>  /  ALT  12  /  AlkPhos  89  10-26    PT/INR - ( 26 Oct 2023 06:13 )   PT: 12.4 sec;   INR: 1.04 ratio         PTT - ( 26 Oct 2023 06:13 )  PTT:31.4 sec  Urinalysis Basic - ( 26 Oct 2023 10:00 )    Color: Yellow / Appearance: Slightly Turbid / S.020 / pH: x  Gluc: x / Ketone: Moderate  / Bili: Negative / Urobili: 1 mg/dL   Blood: x / Protein: 15 mg/dL / Nitrite: Negative   Leuk Esterase: Negative / RBC: 6-10 /HPF / WBC 3-5   Sq Epi: x / Non Sq Epi: x / Bacteria: Occasional      CAPILLARY BLOOD GLUCOSE      POCT Blood Glucose.: 233 mg/dL (27 Oct 2023 16:36)  POCT Blood Glucose.: 269 mg/dL (27 Oct 2023 11:13)  POCT Blood Glucose.: 198 mg/dL (27 Oct 2023 07:58)  POCT Blood Glucose.: 261 mg/dL (26 Oct 2023 21:14)      RADIOLOGY & ADDITIONAL TESTS:    Imaging Personally Reviewed:  [ ] YES  [ ] NO    Consultant(s) Notes Reviewed:  [ ] YES  [ ] NO    Care Discussed with Consultants/Other Providers [ ] YES  [ ] NO
Patient is a 83y old  Female who presents with a chief complaint of Failure to thrive (25 Oct 2023 16:50)      INTERVAL HPI/OVERNIGHT EVENTS:  Pt was seen and examined, no acute events.      MEDICATIONS  (STANDING):  aspirin enteric coated 81 milliGRAM(s) Oral daily  atorvastatin 40 milliGRAM(s) Oral at bedtime  dextrose 5%. 1000 milliLiter(s) (100 mL/Hr) IV Continuous <Continuous>  dextrose 5%. 1000 milliLiter(s) (50 mL/Hr) IV Continuous <Continuous>  dextrose 50% Injectable 25 Gram(s) IV Push once  dextrose 50% Injectable 12.5 Gram(s) IV Push once  dextrose 50% Injectable 25 Gram(s) IV Push once  enoxaparin Injectable 50 milliGRAM(s) SubCutaneous every 12 hours  ferrous    sulfate 325 milliGRAM(s) Oral daily  folic acid 1 milliGRAM(s) Oral daily  glucagon  Injectable 1 milliGRAM(s) IntraMuscular once  insulin lispro (ADMELOG) corrective regimen sliding scale   SubCutaneous at bedtime  insulin lispro (ADMELOG) corrective regimen sliding scale   SubCutaneous three times a day before meals  lactated ringers. 1000 milliLiter(s) (125 mL/Hr) IV Continuous <Continuous>  metoprolol succinate ER 25 milliGRAM(s) Oral daily  pantoprazole    Tablet 40 milliGRAM(s) Oral before breakfast    MEDICATIONS  (PRN):  acetaminophen     Tablet .. 650 milliGRAM(s) Oral every 6 hours PRN Mild Pain (1 - 3), Moderate Pain (4 - 6)  dextrose Oral Gel 15 Gram(s) Oral once PRN Blood Glucose LESS THAN 70 milliGRAM(s)/deciliter  melatonin 3 milliGRAM(s) Oral at bedtime PRN Insomnia      Allergies  ACE inhibitors (Angioedema)  tetanus toxoid (Short breath)        Vital Signs Last 24 Hrs  T(C): 36.9 (26 Oct 2023 11:02), Max: 37.2 (25 Oct 2023 19:04)  T(F): 98.4 (26 Oct 2023 11:02), Max: 99 (25 Oct 2023 19:04)  HR: 86 (26 Oct 2023 11:02) (86 - 96)  BP: 128/66 (26 Oct 2023 11:02) (112/61 - 142/80)  BP(mean): --  RR: 18 (26 Oct 2023 11:02) (18 - 18)  SpO2: 100% (26 Oct 2023 11:02) (96% - 100%)    Parameters below as of 26 Oct 2023 11:02  Patient On (Oxygen Delivery Method): room air        PHYSICAL EXAM:  GENERAL: NAD  HEAD:  Atraumatic  EYES: PERRLA  ENMT: Mouth moist   NECK: Supple  NERVOUS SYSTEM:  Awake, alert  CHEST/LUNG: Clear  HEART: RRR, S1, S2  ABDOMEN: Soft, non tender  EXTREMITIES:  no edema BL LE  SKIN: No rash        LABS:                        14.1   6.31  )-----------( 380      ( 26 Oct 2023 06:13 )             43.3     10-26    137  |  105  |  11  ----------------------------<  250<H>  4.2   |  27  |  0.47<L>    Ca    8.9      26 Oct 2023 06:13  Phos  2.1     10  Mg     1.7     10-26    TPro  6.9  /  Alb  2.1<L>  /  TBili  0.4  /  DBili  x   /  AST  14<L>  /  ALT  12  /  AlkPhos  89  10-26    PT/INR - ( 26 Oct 2023 06:13 )   PT: 12.4 sec;   INR: 1.04 ratio         PTT - ( 26 Oct 2023 06:13 )  PTT:31.4 sec  Urinalysis Basic - ( 26 Oct 2023 10:00 )    Color: Yellow / Appearance: Slightly Turbid / S.020 / pH: x  Gluc: x / Ketone: Moderate  / Bili: Negative / Urobili: 1 mg/dL   Blood: x / Protein: 15 mg/dL / Nitrite: Negative   Leuk Esterase: Negative / RBC: 6-10 /HPF / WBC 3-5   Sq Epi: x / Non Sq Epi: x / Bacteria: Occasional      CAPILLARY BLOOD GLUCOSE      POCT Blood Glucose.: 230 mg/dL (26 Oct 2023 11:18)  POCT Blood Glucose.: 245 mg/dL (26 Oct 2023 07:51)  POCT Blood Glucose.: 313 mg/dL (25 Oct 2023 21:56)  POCT Blood Glucose.: 267 mg/dL (25 Oct 2023 17:00)      RADIOLOGY & ADDITIONAL TESTS:    Imaging Personally Reviewed:  [ ] YES  [ ] NO    Consultant(s) Notes Reviewed:  [ ] YES  [ ] NO    Care Discussed with Consultants/Other Providers [ ] YES  [ ] NO
Patient is a 83y old  Female who presents with a chief complaint of Failure to thrive (28 Oct 2023 16:45)      INTERVAL HPI/OVERNIGHT EVENTS:  Pt was seen and examined, no acute events.      MEDICATIONS  (STANDING):  aspirin enteric coated 81 milliGRAM(s) Oral daily  atorvastatin 40 milliGRAM(s) Oral at bedtime  dextrose 5%. 1000 milliLiter(s) (50 mL/Hr) IV Continuous <Continuous>  dextrose 5%. 1000 milliLiter(s) (100 mL/Hr) IV Continuous <Continuous>  dextrose 50% Injectable 12.5 Gram(s) IV Push once  dextrose 50% Injectable 25 Gram(s) IV Push once  dextrose 50% Injectable 25 Gram(s) IV Push once  enoxaparin Injectable 50 milliGRAM(s) SubCutaneous every 12 hours  ferrous    sulfate 325 milliGRAM(s) Oral daily  folic acid 1 milliGRAM(s) Oral daily  glucagon  Injectable 1 milliGRAM(s) IntraMuscular once  insulin glargine Injectable (LANTUS) 10 Unit(s) SubCutaneous at bedtime  insulin lispro (ADMELOG) corrective regimen sliding scale   SubCutaneous three times a day before meals  insulin lispro (ADMELOG) corrective regimen sliding scale   SubCutaneous at bedtime  insulin lispro Injectable (ADMELOG) 4 Unit(s) SubCutaneous three times a day before meals  lactated ringers. 1000 milliLiter(s) (125 mL/Hr) IV Continuous <Continuous>  metoprolol succinate ER 25 milliGRAM(s) Oral daily  pantoprazole    Tablet 40 milliGRAM(s) Oral before breakfast  senna 2 Tablet(s) Oral at bedtime    MEDICATIONS  (PRN):  acetaminophen     Tablet .. 650 milliGRAM(s) Oral every 6 hours PRN Mild Pain (1 - 3), Moderate Pain (4 - 6)  dextrose Oral Gel 15 Gram(s) Oral once PRN Blood Glucose LESS THAN 70 milliGRAM(s)/deciliter  melatonin 3 milliGRAM(s) Oral at bedtime PRN Insomnia  polyethylene glycol 3350 17 Gram(s) Oral daily PRN Constipation      Allergies    ACE inhibitors (Angioedema)  tetanus toxoid (Short breath)    Intolerances          Vital Signs Last 24 Hrs  T(C): 36.7 (29 Oct 2023 16:48), Max: 37.5 (28 Oct 2023 23:36)  T(F): 98 (29 Oct 2023 16:48), Max: 99.5 (28 Oct 2023 23:36)  HR: 101 (29 Oct 2023 16:48) (61 - 101)  BP: 146/78 (29 Oct 2023 16:48) (126/82 - 150/79)  BP(mean): --  RR: 18 (29 Oct 2023 16:48) (18 - 18)  SpO2: 98% (29 Oct 2023 16:48) (96% - 98%)    Parameters below as of 29 Oct 2023 11:51  Patient On (Oxygen Delivery Method): room air        PHYSICAL EXAM:  GENERAL: NAD  HEAD:  Atraumatic  EYES: PERRLA  ENMT: Mouth moist   NECK: Supple  NERVOUS SYSTEM:  Awake, alert  CHEST/LUNG: Clear  HEART: RRR, S1, S2  ABDOMEN: Soft, non tender  EXTREMITIES:  no edema BL LE  SKIN: No rash        LABS:        CAPILLARY BLOOD GLUCOSE      POCT Blood Glucose.: 208 mg/dL (29 Oct 2023 16:01)  POCT Blood Glucose.: 292 mg/dL (29 Oct 2023 13:38)  POCT Blood Glucose.: 99 mg/dL (29 Oct 2023 08:03)  POCT Blood Glucose.: 181 mg/dL (28 Oct 2023 21:26)      Culture - Urine (collected 26 Oct 2023 10:00)  Source: Clean Catch Clean Catch (Midstream)  Final Report (27 Oct 2023 23:19):    >=3 organisms. Probable collection contamination.      RADIOLOGY & ADDITIONAL TESTS:    Imaging Personally Reviewed:  [ ] YES  [ ] NO    Consultant(s) Notes Reviewed:  [ ] YES  [ ] NO    Care Discussed with Consultants/Other Providers [ ] YES  [ ] NO
Patient is a 83y old  Female who presents with a chief complaint of Failure to thrive (27 Oct 2023 20:36)      INTERVAL HPI/OVERNIGHT EVENTS:  Pt was seen and examined, no acute events.      MEDICATIONS  (STANDING):  aspirin enteric coated 81 milliGRAM(s) Oral daily  atorvastatin 40 milliGRAM(s) Oral at bedtime  dextrose 5%. 1000 milliLiter(s) (50 mL/Hr) IV Continuous <Continuous>  dextrose 5%. 1000 milliLiter(s) (100 mL/Hr) IV Continuous <Continuous>  dextrose 50% Injectable 12.5 Gram(s) IV Push once  dextrose 50% Injectable 25 Gram(s) IV Push once  dextrose 50% Injectable 25 Gram(s) IV Push once  enoxaparin Injectable 50 milliGRAM(s) SubCutaneous every 12 hours  ferrous    sulfate 325 milliGRAM(s) Oral daily  folic acid 1 milliGRAM(s) Oral daily  glucagon  Injectable 1 milliGRAM(s) IntraMuscular once  insulin glargine Injectable (LANTUS) 10 Unit(s) SubCutaneous at bedtime  insulin lispro (ADMELOG) corrective regimen sliding scale   SubCutaneous at bedtime  insulin lispro (ADMELOG) corrective regimen sliding scale   SubCutaneous three times a day before meals  insulin lispro Injectable (ADMELOG) 4 Unit(s) SubCutaneous three times a day before meals  lactated ringers. 1000 milliLiter(s) (125 mL/Hr) IV Continuous <Continuous>  metoprolol succinate ER 25 milliGRAM(s) Oral daily  pantoprazole    Tablet 40 milliGRAM(s) Oral before breakfast  senna 2 Tablet(s) Oral at bedtime    MEDICATIONS  (PRN):  acetaminophen     Tablet .. 650 milliGRAM(s) Oral every 6 hours PRN Mild Pain (1 - 3), Moderate Pain (4 - 6)  dextrose Oral Gel 15 Gram(s) Oral once PRN Blood Glucose LESS THAN 70 milliGRAM(s)/deciliter  melatonin 3 milliGRAM(s) Oral at bedtime PRN Insomnia  polyethylene glycol 3350 17 Gram(s) Oral daily PRN Constipation      Allergies    ACE inhibitors (Angioedema)  tetanus toxoid (Short breath)    Intolerances          Vital Signs Last 24 Hrs  T(C): 36.7 (28 Oct 2023 10:53), Max: 37.6 (27 Oct 2023 17:25)  T(F): 98.1 (28 Oct 2023 10:53), Max: 99.6 (27 Oct 2023 17:25)  HR: 99 (28 Oct 2023 10:53) (73 - 99)  BP: 114/70 (28 Oct 2023 10:53) (110/61 - 145/79)  BP(mean): --  RR: 17 (28 Oct 2023 10:53) (17 - 18)  SpO2: 98% (28 Oct 2023 10:53) (96% - 99%)    Parameters below as of 28 Oct 2023 10:53  Patient On (Oxygen Delivery Method): room air        PHYSICAL EXAM:  GENERAL: NAD  HEAD:  Atraumatic  EYES: PERRLA  ENMT: Mouth moist   NECK: Supple  NERVOUS SYSTEM:  Awake, alert  CHEST/LUNG: Clear  HEART: RRR, S1, S2  ABDOMEN: Soft, non tender  EXTREMITIES:  no edema BL LE  SKIN: No rash        LABS:        CAPILLARY BLOOD GLUCOSE      POCT Blood Glucose.: 153 mg/dL (28 Oct 2023 16:34)  POCT Blood Glucose.: 158 mg/dL (28 Oct 2023 11:52)  POCT Blood Glucose.: 258 mg/dL (28 Oct 2023 07:55)  POCT Blood Glucose.: 275 mg/dL (27 Oct 2023 21:27)      Culture - Urine (collected 26 Oct 2023 10:00)  Source: Clean Catch Clean Catch (Midstream)  Final Report (27 Oct 2023 23:19):    >=3 organisms. Probable collection contamination.      RADIOLOGY & ADDITIONAL TESTS:    Imaging Personally Reviewed:  [ ] YES  [ ] NO    Consultant(s) Notes Reviewed:  [ ] YES  [ ] NO    Care Discussed with Consultants/Other Providers [ ] YES  [ ] NO

## 2023-10-29 NOTE — PROGRESS NOTE ADULT - PROBLEM SELECTOR PLAN 2
Left peroneal vein DVT.  Started on full dose Lovenox
Left peroneal vein DVT.  Started on full dose Lovenox  DOAC on dc

## 2023-10-29 NOTE — PROGRESS NOTE ADULT - NUTRITIONAL ASSESSMENT
This patient has been assessed with a concern for Malnutrition and has been determined to have a diagnosis/diagnoses of Severe protein-calorie malnutrition.    This patient is being managed with:   Diet Easy to Chew-  Consistent Carbohydrate {No Snacks}  Supplement Feeding Modality:  Oral  Glucerna Shake Cans or Servings Per Day:  1       Frequency:  Three Times a day  Entered: Oct 27 2023  3:35PM  

## 2023-10-29 NOTE — PROGRESS NOTE ADULT - PROBLEM SELECTOR PROBLEM 6
Type 2 diabetes mellitus with unspecified complications

## 2023-10-29 NOTE — PROGRESS NOTE ADULT - PROBLEM SELECTOR PLAN 4
monitor BP and titrate BP med

## 2023-10-30 ENCOUNTER — TRANSCRIPTION ENCOUNTER (OUTPATIENT)
Age: 83
End: 2023-10-30

## 2023-10-30 VITALS
TEMPERATURE: 99 F | RESPIRATION RATE: 16 BRPM | SYSTOLIC BLOOD PRESSURE: 152 MMHG | DIASTOLIC BLOOD PRESSURE: 82 MMHG | OXYGEN SATURATION: 98 % | HEART RATE: 90 BPM

## 2023-10-30 LAB
GLUCOSE BLDC GLUCOMTR-MCNC: 171 MG/DL — HIGH (ref 70–99)
GLUCOSE BLDC GLUCOMTR-MCNC: 171 MG/DL — HIGH (ref 70–99)
GLUCOSE BLDC GLUCOMTR-MCNC: 263 MG/DL — HIGH (ref 70–99)
GLUCOSE BLDC GLUCOMTR-MCNC: 263 MG/DL — HIGH (ref 70–99)
GLUCOSE BLDC GLUCOMTR-MCNC: 79 MG/DL — SIGNIFICANT CHANGE UP (ref 70–99)
GLUCOSE BLDC GLUCOMTR-MCNC: 79 MG/DL — SIGNIFICANT CHANGE UP (ref 70–99)

## 2023-10-30 PROCEDURE — 99239 HOSP IP/OBS DSCHRG MGMT >30: CPT

## 2023-10-30 RX ORDER — FERROUS SULFATE 325(65) MG
1 TABLET ORAL
Qty: 0 | Refills: 0 | DISCHARGE
Start: 2023-10-30

## 2023-10-30 RX ORDER — FERROUS SULFATE 325(65) MG
1 TABLET ORAL
Qty: 0 | Refills: 0 | DISCHARGE

## 2023-10-30 RX ORDER — NORTRIPTYLINE HYDROCHLORIDE 10 MG/1
1 CAPSULE ORAL
Qty: 0 | Refills: 0 | DISCHARGE

## 2023-10-30 RX ORDER — ROSUVASTATIN CALCIUM 5 MG/1
1 TABLET ORAL
Qty: 0 | Refills: 0 | DISCHARGE

## 2023-10-30 RX ORDER — APIXABAN 2.5 MG/1
1 TABLET, FILM COATED ORAL
Qty: 60 | Refills: 0
Start: 2023-10-30 | End: 2023-11-28

## 2023-10-30 RX ORDER — INSULIN GLARGINE 100 [IU]/ML
10 INJECTION, SOLUTION SUBCUTANEOUS
Qty: 0 | Refills: 0 | DISCHARGE
Start: 2023-10-30

## 2023-10-30 RX ORDER — INSULIN LISPRO 100/ML
4 VIAL (ML) SUBCUTANEOUS
Qty: 0 | Refills: 0 | DISCHARGE
Start: 2023-10-30

## 2023-10-30 RX ORDER — ATORVASTATIN CALCIUM 80 MG/1
1 TABLET, FILM COATED ORAL
Qty: 0 | Refills: 0 | DISCHARGE
Start: 2023-10-30

## 2023-10-30 RX ORDER — POLYETHYLENE GLYCOL 3350 17 G/17G
17 POWDER, FOR SOLUTION ORAL
Qty: 0 | Refills: 0 | DISCHARGE
Start: 2023-10-30

## 2023-10-30 RX ADMIN — Medication 1 MILLIGRAM(S): at 12:16

## 2023-10-30 RX ADMIN — SODIUM CHLORIDE 125 MILLILITER(S): 9 INJECTION, SOLUTION INTRAVENOUS at 05:00

## 2023-10-30 RX ADMIN — Medication 325 MILLIGRAM(S): at 12:16

## 2023-10-30 RX ADMIN — Medication 25 MILLIGRAM(S): at 05:01

## 2023-10-30 RX ADMIN — Medication 81 MILLIGRAM(S): at 12:16

## 2023-10-30 RX ADMIN — Medication 4 UNIT(S): at 12:11

## 2023-10-30 RX ADMIN — Medication 3: at 12:10

## 2023-10-30 RX ADMIN — ENOXAPARIN SODIUM 50 MILLIGRAM(S): 100 INJECTION SUBCUTANEOUS at 05:01

## 2023-10-30 NOTE — DISCHARGE NOTE PROVIDER - NSDCMRMEDTOKEN_GEN_ALL_CORE_FT
Artificial Tears ophthalmic solution: 1 drop(s) to each affected eye 2 times a day  aspirin 81 mg oral tablet: 1 tab(s) orally once a day  atorvastatin 40 mg oral tablet: 1 tab(s) orally once a day (at bedtime)  calcium: 1 tab(s) orally once a day  Colace: 100 milligram(s) orally 3 times a day  Eliquis Starter Pack for Treatment of DVT and PE 5 mg oral tablet: 1 tab(s) orally 2 times a day 10 mg BID X 7 days then 5 mg BID  ferrous sulfate 325 mg (65 mg elemental iron) oral tablet: 1 tab(s) orally once a day  folic acid 1 mg oral tablet: 1 tab(s) orally once a day  insulin glargine 100 units/mL subcutaneous solution: 10 unit(s) subcutaneous once a day (at bedtime)  insulin lispro 100 units/mL injectable solution: 4 unit(s) injectable 3 times a day (before meals)  Januvia 25 mg oral tablet: 1 tab(s) orally once a day  metFORMIN 500 mg oral tablet: 1 tab(s) orally 2 times a day  Metoprolol Succinate ER 25 mg oral tablet, extended release: 1 tab(s) orally once a day  omeprazole 20 mg oral delayed release capsule: 1 cap(s) orally once a day  polyethylene glycol 3350 oral powder for reconstitution: 17 gram(s) orally once a day As needed Constipation  Vitamin D3 2000 intl units oral capsule: 1 cap(s) orally once a day

## 2023-10-30 NOTE — DISCHARGE NOTE PROVIDER - PROVIDER TOKENS
PROVIDER:[TOKEN:[[id number]]],FREE:[LAST:[pcp],PHONE:[(   )    -],FAX:[(   )    -]],PROVIDER:[TOKEN:[[id number]]]

## 2023-10-30 NOTE — DISCHARGE NOTE PROVIDER - CARE PROVIDERS DIRECT ADDRESSES
,DirectAddress_Unknown,DirectAddress_Unknown,[email or web address]

## 2023-10-30 NOTE — DISCHARGE NOTE NURSING/CASE MANAGEMENT/SOCIAL WORK - NSCORESITESY/N_GEN_A_CORE_RD
A/P: 29yo PPD#1 s/p .  Patient is stable and doing well post-partum.   - Pain well controlled, continue current pain regimen  - Increase ambulation, SCDs when not ambulating  - Continue regular diet  - Discharge planning     Lyn Johnson, PGY-1
Yes

## 2023-10-30 NOTE — DISCHARGE NOTE PROVIDER - DETAILS OF MALNUTRITION DIAGNOSIS/DIAGNOSES
This patient has been assessed with a concern for Malnutrition and was treated during this hospitalization for the following Nutrition diagnosis/diagnoses:     -  10/27/2023: Severe protein-calorie malnutrition

## 2023-10-30 NOTE — DISCHARGE NOTE NURSING/CASE MANAGEMENT/SOCIAL WORK - NSDCPEFALRISK_GEN_ALL_CORE
For information on Fall & Injury Prevention, visit: [email or web address] OR  [email or web address] OR  https://www.cdc.gov/steadi/patient.html

## 2023-10-30 NOTE — DISCHARGE NOTE PROVIDER - CARE PROVIDER_API CALL
César Pastor  Endocrinology/Metab/Diabetes  [address], [address]  [place] [address]  Phone: [phone]  Fax: [phone]  Follow Up Time:     pcp,   Phone: (   )    -  Fax: (   )    -  Follow Up Time:     Lucas Cisneros  Hematology  [address] [place] [address]  Phone: [phone]  Fax: [phone]  Follow Up Time:

## 2023-10-30 NOTE — DISCHARGE NOTE NURSING/CASE MANAGEMENT/SOCIAL WORK - PATIENT PORTAL LINK FT
You can access the FollowMyHealth Patient Portal offered by [facility] by registering at the following website: [email or web address]. By joining [facility]’s FollowMyHealth portal, you will also be able to view your health information using other applications (apps) compatible with our system.

## 2023-10-30 NOTE — DISCHARGE NOTE PROVIDER - ATTENDING DISCHARGE PHYSICAL EXAMINATION:
Vital Signs Last 24 Hrs  T(C): 37.1 (30 Oct 2023 12:18), Max: 37.2 (29 Oct 2023 23:56)  T(F): 98.7 (30 Oct 2023 12:18), Max: 98.9 (29 Oct 2023 23:56)  HR: 96 (30 Oct 2023 12:18) (74 - 101)  BP: 145/82 (30 Oct 2023 12:18) (145/82 - 147/82)  BP(mean): --  RR: 17 (30 Oct 2023 12:18) (17 - 19)  SpO2: 97% (30 Oct 2023 12:18) (97% - 98%)    Parameters below as of 30 Oct 2023 12:18  Patient On (Oxygen Delivery Method): room air    GENERAL: NAD  HEAD:  Atraumatic  EYES: PERRLA  ENMT: Mouth moist   NECK: Supple  NERVOUS SYSTEM:  Awake, alert  CHEST/LUNG: Clear  HEART: RRR, S1, S2  ABDOMEN: Soft, non tender  EXTREMITIES:  no edema BL LE  SKIN: No rash

## 2023-10-30 NOTE — DISCHARGE NOTE PROVIDER - NSDCCPCAREPLAN_GEN_ALL_CORE_FT
PRINCIPAL DISCHARGE DIAGNOSIS  Diagnosis: Altered mental status  Assessment and Plan of Treatment: 83 years old female with h/o HTN, HLD, DM was brought in to ED for medical evaluation. Per [facility] care clinical manager, patient has not seen any docotro for more than 1 years. Per aid Vanessa [phone], patient has not been eating and not taking meds. Patient is renting a room with other people. Patient denied any nausea, vomiting, diarrhea, urinary symptoms. Patient is A&O x 2 and relatively a poor historian.   Tachycardic upon arrival, BP stable, afebrile, sat well at RA. EKG with sinus tachy. No leukocytosis, plt 419, ddimer 1627, K 4.2, Cr 0.75, glucose 289, serum acetone small. Flu/COVID negative. CT head with no acute pathology. CTA with no PE. No focal consolidation  Severe protein-calorie malnutrition.  This patient is being managed with:   Diet Easy to Chew-  Consistent Carbohydrate {No Snacks}  Supplement Feeding Modality:  Oral  Glucerna Shake Cans or Servings Per Day:  1       Frequency:  Three Times a day  Entered: Oct 27 2023  3:35PM  Adult failure to thrive.   brought in to ED due to not eating and not taking medications  Clinically appear dry, dehydrated and malnourished  positive serum acetone wit normal bicarb and normal AGAP,  likely due to fasting ketoacidosis. IV fluid, encourage oral intake  PT  and nutrition consult  CT A/P: no acute finding , acute stercoral colitis, bowel regimen ordered.  DVT of lower limb, acute.   Left peroneal vein DVT.  Started on full dose Lovenox  DOAC on dc.  Hem follow up outpt  Benign essential HTN.   monitor BP and titrate BP med.  Hyperlipidemia, unspecified.   on statin.  Type 2 diabetes mellitus with unspecified complications. uncontrolled, with hyperglycemia  A1c 12.4.  Basal bolus insulin , resume home med  Endo follow up

## 2023-10-30 NOTE — DISCHARGE NOTE PROVIDER - HOSPITAL COURSE
83 years old female with h/o HTN, HLD, DM was brought in to ED for medical evaluation. Per [facility] care clinical manager, patient has not seen any docotro for more than 1 years. Per aid Vanessa [phone], patient has not been eating and not taking meds. Patient is renting a room with other people. Patient denied any nausea, vomiting, diarrhea, urinary symptoms. Patient is A&O x 2 and relatively a poor historian.   Tachycardic upon arrival, BP stable, afebrile, sat well at RA. EKG with sinus tachy. No leukocytosis, plt 419, ddimer 1627, K 4.2, Cr 0.75, glucose 289, serum acetone small. Flu/COVID negative. CT head with no acute pathology. CTA with no PE. No focal consolidation    Severe protein-calorie malnutrition.  This patient is being managed with:   Diet Easy to Chew-  Consistent Carbohydrate {No Snacks}  Supplement Feeding Modality:  Oral  Glucerna Shake Cans or Servings Per Day:  1       Frequency:  Three Times a day  Entered: Oct 27 2023  3:35PM    Adult failure to thrive.   brought in to ED due to not eating and not taking medications  Clinically appear dry, dehydrated and malnourished  positive serum acetone wit normal bicarb and normal AGAP,  likely due to fasting ketoacidosis. IV fluid, encourage oral intake  PT  and nutrition consult  CT A/P: no acute finding , acute stercoral colitis, bowel regimen ordered.    DVT of lower limb, acute.   Left peroneal vein DVT.  Started on full dose Lovenox  DOAC on dc.  Hem follow up outpt    Malnutrition.   appear malnourished  encourage oral intake  Nutrition consult.    Benign essential HTN.   monitor BP and titrate BP med.    Hyperlipidemia, unspecified.   on statin.    Type 2 diabetes mellitus with unspecified complications. uncontrolled, with hyperglycemia  A1c 12.4.  Basal bolus insulin , resume home med  Endo follow up

## 2023-11-05 DIAGNOSIS — R00.0 TACHYCARDIA, UNSPECIFIED: ICD-10-CM

## 2023-11-05 DIAGNOSIS — Z88.7 ALLERGY STATUS TO SERUM AND VACCINE: ICD-10-CM

## 2023-11-05 DIAGNOSIS — Z79.82 LONG TERM (CURRENT) USE OF ASPIRIN: ICD-10-CM

## 2023-11-05 DIAGNOSIS — Z88.8 ALLERGY STATUS TO OTHER DRUGS, MEDICAMENTS AND BIOLOGICAL SUBSTANCES: ICD-10-CM

## 2023-11-05 DIAGNOSIS — R62.7 ADULT FAILURE TO THRIVE: ICD-10-CM

## 2023-11-05 DIAGNOSIS — M06.9 RHEUMATOID ARTHRITIS, UNSPECIFIED: ICD-10-CM

## 2023-11-05 DIAGNOSIS — I10 ESSENTIAL (PRIMARY) HYPERTENSION: ICD-10-CM

## 2023-11-05 DIAGNOSIS — M17.12 UNILATERAL PRIMARY OSTEOARTHRITIS, LEFT KNEE: ICD-10-CM

## 2023-11-05 DIAGNOSIS — G43.909 MIGRAINE, UNSPECIFIED, NOT INTRACTABLE, WITHOUT STATUS MIGRAINOSUS: ICD-10-CM

## 2023-11-05 DIAGNOSIS — Z90.710 ACQUIRED ABSENCE OF BOTH CERVIX AND UTERUS: ICD-10-CM

## 2023-11-05 DIAGNOSIS — E11.65 TYPE 2 DIABETES MELLITUS WITH HYPERGLYCEMIA: ICD-10-CM

## 2023-11-05 DIAGNOSIS — Z86.73 PERSONAL HISTORY OF TRANSIENT ISCHEMIC ATTACK (TIA), AND CEREBRAL INFARCTION WITHOUT RESIDUAL DEFICITS: ICD-10-CM

## 2023-11-05 DIAGNOSIS — E43 UNSPECIFIED SEVERE PROTEIN-CALORIE MALNUTRITION: ICD-10-CM

## 2023-11-05 DIAGNOSIS — K52.9 NONINFECTIVE GASTROENTERITIS AND COLITIS, UNSPECIFIED: ICD-10-CM

## 2023-11-05 DIAGNOSIS — Z90.49 ACQUIRED ABSENCE OF OTHER SPECIFIED PARTS OF DIGESTIVE TRACT: ICD-10-CM

## 2023-11-05 DIAGNOSIS — E78.5 HYPERLIPIDEMIA, UNSPECIFIED: ICD-10-CM

## 2023-11-05 DIAGNOSIS — Z79.84 LONG TERM (CURRENT) USE OF ORAL HYPOGLYCEMIC DRUGS: ICD-10-CM

## 2023-11-05 DIAGNOSIS — Z90.722 ACQUIRED ABSENCE OF OVARIES, BILATERAL: ICD-10-CM

## 2023-11-05 DIAGNOSIS — Z91.148 PATIENT'S OTHER NONCOMPLIANCE WITH MEDICATION REGIMEN FOR OTHER REASON: ICD-10-CM

## 2023-11-05 DIAGNOSIS — I82.452 ACUTE EMBOLISM AND THROMBOSIS OF LEFT PERONEAL VEIN: ICD-10-CM

## 2023-11-05 DIAGNOSIS — E11.319 TYPE 2 DIABETES MELLITUS WITH UNSPECIFIED DIABETIC RETINOPATHY WITHOUT MACULAR EDEMA: ICD-10-CM

## 2023-11-05 DIAGNOSIS — Z79.01 LONG TERM (CURRENT) USE OF ANTICOAGULANTS: ICD-10-CM

## 2023-11-05 DIAGNOSIS — M79.7 FIBROMYALGIA: ICD-10-CM

## 2023-11-05 DIAGNOSIS — H26.9 UNSPECIFIED CATARACT: ICD-10-CM

## 2023-11-05 DIAGNOSIS — E87.20 ACIDOSIS, UNSPECIFIED: ICD-10-CM

## 2023-11-05 DIAGNOSIS — K21.9 GASTRO-ESOPHAGEAL REFLUX DISEASE WITHOUT ESOPHAGITIS: ICD-10-CM

## 2023-11-05 DIAGNOSIS — E86.0 DEHYDRATION: ICD-10-CM

## 2023-11-05 DIAGNOSIS — H04.123 DRY EYE SYNDROME OF BILATERAL LACRIMAL GLANDS: ICD-10-CM

## 2024-04-18 NOTE — ED PROVIDER NOTE - SKIN NEGATIVE STATEMENT, MLM
Per Dr Mesa will need YASMINE 21 days after treatment completed. Pt notified and appt made for 5/22/24 with Nehal Holliday CNP. Pt understands to call back and reschedule appt if there is any variant or changes in the current scheduling of antibiotics Pt will start doxycycline tomorrow and azithromycin to follow.    no abrasions, no jaundice, no lesions, no pruritis, and no rashes.

## 2025-05-27 NOTE — PHYSICAL THERAPY INITIAL EVALUATION ADULT - WEIGHT-BEARING RESTRICTIONS: GAIT, REHAB EVAL
Consultation:   Subjective      Darline oGnzalez is a 79 y.o. year old female is here for consult for syncope/ memory loss.     Referred by Jo Sanchez MD  Arrives with son, Domingo, today.     HPI  She has had memory issues.  She has trouble remembering names.  Long term memory is good.   Personality seems to be changed.     Has not had passing out episodes.   Her son is around more often now, lives with her since 2021.   Has a ppm now.   Hx of migraines.   More imbalance lately.    Son is worried about her untreated sleep apnea, she wakes up and gasps for air, body jerking at times.   She has low energy.   She tells me that she used to love to travel and walk.  She likes to read.   I did see patient in 2020 as well as 2019.  She was having left hand shaking at that time.  She had passed out in her kitchen in 2020.  She had a brain MRI that shows a meningioma in 2021 as well as small vessel disease and a questionable pontine stroke on the left side.  She had carotid ultrasound at that appointment was normal.  She has obstructive sleep apnea.  She is seeing cardiology.  She has history of hypertension.  Her echo in 2018 showed left ventricular hypertrophy.  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  She has a history of Bell's palsy in 2018.  She had a EEG that was normal.  She had MRA head that showed no significant large vessel stenosis.    Sleep is a problem.     FH: mother had dementia at age 88.  Review of Systems    Patient Active Problem List   Diagnosis    CAD (coronary artery disease)    Benign essential hypertension    Dyspnea on exertion    History of breast cancer    Hypercholesterolemia    Lacunar infarction (Multi)    Left pontine stroke (Multi)    Vertigo    Meningioma (Multi)    Aortic stenosis    Mild aortic stenosis    Moderate left ventricular hypertrophy    TRINA on CPAP    Pulmonary nodules    Schatzki's ring    Supraventricular arrhythmia     Syncope and collapse    Breast cancer    Breast cancer of lower-outer quadrant of left female breast    White matter disease    Sick sinus syndrome due to SA node dysfunction (Multi)    Depression, recurrent    Stage 3a chronic kidney disease (Multi)    Sciatica of right side     Past Medical History:   Diagnosis Date    Anxiety     Aortic stenosis 02/07/2023    mild    Arrhythmia     Sick sinus syndrome due to SA node dysfunction    Benign essential hypertension 02/07/2023    Breast cancer 01/05/2012    Breast cancer of lower-outer quadrant of left female breast 10/24/2015    CAD (coronary artery disease) 02/07/2023    - s/p PTCA/stent 2003 with Dr. Calhoun/ [place]; recent CT cardiac scoring 2-5-18 : coronary calcium score = 906.7      Dr. Chavarria notation 5/3/2023: BMS to RCA 2003       Coronary artery disease     cards: Nancy Chavarria,Aortic stenosis    Depression     Deviated nasal septum     Deviated septum    Dry mouth, unspecified     Dry mouth    Dysfunctional uterine bleeding     Elevated C-reactive protein (CRP)     CRP elevated    Fatty (change of) liver, not elsewhere classified     Fatty liver    Fibroid     Ob/Gyn; Dina Grimes    GERD (gastroesophageal reflux disease)     Heart disease     Hemorrhage of anus and rectum     Rectal bleed    Hypercholesterolemia 02/07/2023    Hyperlipidemia     Hypertension     Incisional hernia     Lacunar infarction (Multi) 02/07/2023    Localized edema     Bilateral edema of lower extremity    Low back pain, unspecified 10/23/2016    Acute low back pain    Meningioma (Multi) 02/07/2023    TRINA on CPAP 02/07/2023    Other abnormal glucose     Elevated glucose    Other conditions influencing health status 07/12/2019    History of cough    Other specified abnormal immunological findings in serum     Positive KURT (antinuclear antibody)    Personal history of malignant neoplasm of breast 06/17/2022    History of malignant neoplasm of breast    Personal history of other benign  neoplasm     History of uterine leiomyoma    Personal history of other diseases of the musculoskeletal system and connective tissue     History of necrotizing fasciitis    Personal history of other diseases of the musculoskeletal system and connective tissue     History of temporomandibular joint disorder    Personal history of other diseases of the nervous system and sense organs     History of carpal tunnel syndrome    Restless leg syndrome     Sick sinus syndrome due to SA node dysfunction (Multi) 06/27/2023    Sleep apnea     Uses CPAP    Stage 3a chronic kidney disease (Multi) 04/25/2024    Syncope     hx of syncopal episodes and near syncopal episodes.     Past Surgical History:   Procedure Laterality Date    BREAST LUMPECTOMY  02/25/2014    Left Breast Lumpectomy    BREAST SURGERY  09/10/2018    Breast Surgery Reduction Procedure    CHOLECYSTECTOMY      COLONOSCOPY      DILATION AND CURETTAGE OF UTERUS  02/25/2014    Dilation And Curettage    ENDOSCOPIC INSERTION PERITONEAL CATHETER PORT      INSERT / REPLACE / REMOVE PACEMAKER  06/09/2023    MR HEAD ANGIO WO IV CONTRAST  06/29/2020    MR HEAD ANGIO WO IV CONTRAST 6/29/2020 [facility]CILL[facility]    OTHER SURGICAL HISTORY  02/25/2014    Surgery    OTHER SURGICAL HISTORY  02/25/2014    Skin Debridement Buttocks    OTHER SURGICAL HISTORY  02/25/2014    Percutaneous Portal Vein Catheter Placement    OTHER SURGICAL HISTORY  06/04/2018    History Of Prior Surgery     Social History     Tobacco Use    Smoking status: Never    Smokeless tobacco: Never   Substance Use Topics    Alcohol use: Not Currently     Comment: 1 drink every 3 months     family history includes Breast cancer in her mother and mother's sister; Cervical cancer in her maternal grandmother; Diabetes type II in her mother; Hypertension in her mother.    Current Outpatient Medications:     acetaminophen (Tylenol) 325 mg tablet, Take 2 tablets (650 mg) by mouth every 6 hours if needed for mild pain (1 -  "3)., Disp: 90 tablet, Rfl: 2    aspirin 81 mg EC tablet, Take 1 tablet (81 mg) by mouth once daily., Disp: , Rfl:     cholecalciferol (Vitamin D-3) 50 mcg (2,000 unit) capsule, Take 1 capsule (50 mcg) by mouth once daily., Disp: , Rfl:     cyanocobalamin (Vitamin B-12) 1,000 mcg tablet, Take 1 tablet (1,000 mcg) by mouth once daily., Disp: , Rfl:     escitalopram (Lexapro) 20 mg tablet, Take 1 tablet (20 mg) by mouth once daily., Disp: 90 tablet, Rfl: 0    lisinopril 10 mg tablet, Take 1 tablet (10 mg) by mouth once daily., Disp: 90 tablet, Rfl: 3    pantoprazole (ProtoNix) 40 mg EC tablet, Take 1 tablet (40 mg) by mouth once daily., Disp: 90 tablet, Rfl: 0    rosuvastatin (Crestor) 20 mg tablet, Take 1 tablet (20 mg) by mouth once daily., Disp: 90 tablet, Rfl: 1  Allergies   Allergen Reactions    Sulfa (Sulfonamide Antibiotics) Itching    Latex Rash     BP (!) 134/94 (BP Location: Right arm, Patient Position: Sitting)   Pulse 60   Temp 36.4 °C (97.5 °F)   Resp 10   Ht 1.727 m (5' 8\")   Wt 91.6 kg (202 lb)   BMI 30.71 kg/m²   Neurological Exam/Physical Exam:    Constitutional: General appearance: no acute distress. Pleasant.   Auscultation of Heart: Regular rate and rhythm, no murmurs, normal S1 and S2.   Carotid Arteries: no bruits  Peripheral Vascular Exam: No swelling in extremities  Mental status: no distress, alert, interactive and cooperative. Affect is appropriate.   Memory: recent memory intact   Attention: normal attention  Language: normal comprehension   Fund of knowledge: Patient displays adequate knowledge of current events.  Eyes: The ophthalmoscopic examination was normal.   Cranial nerve II: Visual fields full to confrontation.   Cranial nerves III, IV, and VI: Pupils round, equally reactive to light; EOMs intact. No nystagmus.   Cranial Nerve V: Facial sensation intact to LT bilaterally.   Cranial nerve VII: no facial droop  Cranial nerve VIII: Hearing is intact  Cranial nerves IX and X: " Palate elevates symmetrically.   Cranial nerve XI: Shoulder shrug intact.   Cranial nerve XII: Tongue is midline.  Motor:  Muscle bulk was normal in both upper and lower extremities.    No atrophy.   Strength is normal.   Deep Tendon Reflexes: left biceps 2+ , right biceps 2+, left triceps 2+, right triceps 2+, left brachioradialis 2+, right brachioradialis 2+, left patella 2+, right patella 2+, left ankle jerk 2+, right ankle jerk 2+   Plantar Reflex: Toes downgoing to plantar stimulation on the left. Toes downgoing to plantar stimulation on the right.   Sensory Exam: Normal to vibratory sensation  Coordination:  no limb dystaxia and rapid alternating movements are intact.   Gait:  cautious.         Labs:  CBC:   Lab Results   Component Value Date    WBC 7.5 02/19/2024    HGB 15.0 02/19/2024    HCT 47.0 (H) 02/19/2024     02/19/2024     BMP:   Lab Results   Component Value Date     07/18/2024    K 4.5 07/18/2024     07/18/2024    CO2 24 07/18/2024    BUN 21 07/18/2024    CREATININE 0.99 07/18/2024    CALCIUM 10.3 07/18/2024    MG 1.97 11/21/2023     LFT:   Lab Results   Component Value Date    ALKPHOS 72 07/18/2024    BILITOT 0.8 07/18/2024    PROT 7.4 07/18/2024    ALBUMIN 4.6 07/18/2024    ALT 30 07/18/2024    AST 29 07/18/2024       Labs were viewed personally.   Kidney function normal.  CBC shows no signs of anemia.  Electrolytes normal.  LFTS are also unremarkable.       Assessment/Plan   Problem List Items Addressed This Visit    None    Repeat MRI brain due to new memory changes, meningioma.   Neuropsychology testing.   TRINA, untreated and sleep issues may be contributing.    full weight-bearing

## 2025-07-09 NOTE — ED PROVIDER NOTE - NSICDXPASTSURGICALHX_GEN_ALL_CORE_FT
not applicable PAST SURGICAL HISTORY:  History of salpingo-oophorectomy right - after ectopic pregnancy    S/P appendectomy     S/P arthroscopy of left knee 2005    S/P bunionectomy right and left    S/P cataract extraction bilateral eyes with artificial lenses    S/P eye surgery bilateral    S/P hysterectomy with oophorectomy 1996    S/P tonsillectomy age 17